# Patient Record
Sex: FEMALE | Race: WHITE | Employment: OTHER | URBAN - METROPOLITAN AREA
[De-identification: names, ages, dates, MRNs, and addresses within clinical notes are randomized per-mention and may not be internally consistent; named-entity substitution may affect disease eponyms.]

---

## 2017-01-19 ENCOUNTER — TELEPHONE (OUTPATIENT)
Dept: FAMILY MEDICINE CLINIC | Facility: CLINIC | Age: 77
End: 2017-01-19

## 2017-01-19 NOTE — TELEPHONE ENCOUNTER
S/W Dr. Pascual Llanos she know pt right away due to her canceling and no showing the last several appts I advised of below Dr. Chay Way stated she is worried about having her come here due to feeling forced or anxious her BP could go up or worse she stated that seei

## 2017-01-19 NOTE — TELEPHONE ENCOUNTER
RIYA (daughter-in-law) called pt is depressed, and not taking meds it will be hard for her to get her out of house if we call and talk to her about an appt.  Fish Nam works over at the hospital pt has made appts for last few appts and canceled or no showed sh

## 2017-01-23 RX ORDER — LOSARTAN POTASSIUM 100 MG/1
TABLET ORAL
Qty: 30 TABLET | Refills: 0 | Status: SHIPPED | OUTPATIENT
Start: 2017-01-23 | End: 2017-03-25

## 2017-02-01 ENCOUNTER — TELEPHONE (OUTPATIENT)
Dept: FAMILY MEDICINE CLINIC | Facility: CLINIC | Age: 77
End: 2017-02-01

## 2017-02-16 RX ORDER — METOPROLOL SUCCINATE 50 MG/1
TABLET, EXTENDED RELEASE ORAL
Qty: 30 TABLET | Refills: 0 | Status: SHIPPED | OUTPATIENT
Start: 2017-02-16 | End: 2017-03-19

## 2017-02-16 RX ORDER — AMLODIPINE BESYLATE 10 MG/1
TABLET ORAL
Qty: 30 TABLET | Refills: 0 | Status: SHIPPED | OUTPATIENT
Start: 2017-02-16 | End: 2017-03-18

## 2017-02-22 RX ORDER — ESCITALOPRAM OXALATE 10 MG/1
TABLET ORAL
Qty: 30 TABLET | Refills: 0 | Status: SHIPPED | OUTPATIENT
Start: 2017-02-22 | End: 2017-05-04

## 2017-03-01 ENCOUNTER — TELEPHONE (OUTPATIENT)
Dept: FAMILY MEDICINE CLINIC | Facility: CLINIC | Age: 77
End: 2017-03-01

## 2017-03-01 NOTE — TELEPHONE ENCOUNTER
LVM TO CALL AND RES MED ANNUAL VISIT FROM 3/22/17 TO BEGINNING OF MAY , PT HAD MED ANNUAL VISIT LAST YEAR IN April 29TH

## 2017-03-08 RX ORDER — ESCITALOPRAM OXALATE 10 MG/1
TABLET ORAL
Qty: 30 TABLET | Refills: 0 | Status: SHIPPED | OUTPATIENT
Start: 2017-03-08 | End: 2017-05-04

## 2017-03-20 RX ORDER — AMLODIPINE BESYLATE 10 MG/1
TABLET ORAL
Qty: 30 TABLET | Refills: 0 | Status: SHIPPED | OUTPATIENT
Start: 2017-03-20 | End: 2017-04-18

## 2017-03-20 RX ORDER — METOPROLOL SUCCINATE 50 MG/1
TABLET, EXTENDED RELEASE ORAL
Qty: 30 TABLET | Refills: 1 | Status: SHIPPED | OUTPATIENT
Start: 2017-03-20 | End: 2017-05-04

## 2017-03-27 RX ORDER — LOSARTAN POTASSIUM 100 MG/1
TABLET ORAL
Qty: 30 TABLET | Refills: 0 | Status: SHIPPED | OUTPATIENT
Start: 2017-03-27 | End: 2017-04-23

## 2017-04-19 RX ORDER — AMLODIPINE BESYLATE 10 MG/1
TABLET ORAL
Qty: 30 TABLET | Refills: 0 | Status: SHIPPED | OUTPATIENT
Start: 2017-04-19 | End: 2017-05-04

## 2017-04-24 RX ORDER — LOSARTAN POTASSIUM 100 MG/1
TABLET ORAL
Qty: 30 TABLET | Refills: 0 | Status: SHIPPED | OUTPATIENT
Start: 2017-04-24 | End: 2017-05-04

## 2017-04-28 RX ORDER — HYDROCHLOROTHIAZIDE 25 MG/1
TABLET ORAL
Qty: 30 TABLET | Refills: 0 | Status: SHIPPED | OUTPATIENT
Start: 2017-04-28 | End: 2017-05-04

## 2017-05-04 ENCOUNTER — OFFICE VISIT (OUTPATIENT)
Dept: FAMILY MEDICINE CLINIC | Facility: CLINIC | Age: 77
End: 2017-05-04

## 2017-05-04 VITALS
WEIGHT: 129 LBS | BODY MASS INDEX: 23.74 KG/M2 | RESPIRATION RATE: 16 BRPM | SYSTOLIC BLOOD PRESSURE: 116 MMHG | HEIGHT: 62 IN | TEMPERATURE: 97 F | HEART RATE: 86 BPM | DIASTOLIC BLOOD PRESSURE: 64 MMHG

## 2017-05-04 DIAGNOSIS — R60.0 BILATERAL EDEMA OF LOWER EXTREMITY: ICD-10-CM

## 2017-05-04 DIAGNOSIS — E78.2 HYPERLIPIDEMIA, MIXED: ICD-10-CM

## 2017-05-04 DIAGNOSIS — Z00.00 ENCOUNTER FOR ANNUAL HEALTH EXAMINATION: Primary | ICD-10-CM

## 2017-05-04 DIAGNOSIS — Z78.0 POSTMENOPAUSAL: ICD-10-CM

## 2017-05-04 DIAGNOSIS — I48.0 PAROXYSMAL A-FIB (HCC): ICD-10-CM

## 2017-05-04 DIAGNOSIS — I10 ESSENTIAL HYPERTENSION: ICD-10-CM

## 2017-05-04 DIAGNOSIS — R71.8 ELEVATED MCV: ICD-10-CM

## 2017-05-04 DIAGNOSIS — F32.A DEPRESSION, UNSPECIFIED DEPRESSION TYPE: ICD-10-CM

## 2017-05-04 DIAGNOSIS — F41.9 ANXIETY: ICD-10-CM

## 2017-05-04 DIAGNOSIS — F17.200 SMOKER: ICD-10-CM

## 2017-05-04 PROCEDURE — G0444 DEPRESSION SCREEN ANNUAL: HCPCS | Performed by: FAMILY MEDICINE

## 2017-05-04 PROCEDURE — G0439 PPPS, SUBSEQ VISIT: HCPCS | Performed by: FAMILY MEDICINE

## 2017-05-04 PROCEDURE — 99214 OFFICE O/P EST MOD 30 MIN: CPT | Performed by: FAMILY MEDICINE

## 2017-05-04 RX ORDER — METOPROLOL SUCCINATE 50 MG/1
TABLET, EXTENDED RELEASE ORAL
Qty: 30 TABLET | Refills: 5 | Status: SHIPPED | OUTPATIENT
Start: 2017-05-04 | End: 2018-03-02

## 2017-05-04 RX ORDER — ESCITALOPRAM OXALATE 10 MG/1
TABLET ORAL
Qty: 30 TABLET | Refills: 5 | Status: SHIPPED | OUTPATIENT
Start: 2017-05-04 | End: 2017-12-22

## 2017-05-04 RX ORDER — LOSARTAN POTASSIUM 100 MG/1
TABLET ORAL
Qty: 30 TABLET | Refills: 5 | Status: SHIPPED | OUTPATIENT
Start: 2017-05-04 | End: 2017-10-24

## 2017-05-04 RX ORDER — HYDROCHLOROTHIAZIDE 25 MG/1
25 TABLET ORAL
Qty: 30 TABLET | Refills: 5 | Status: SHIPPED | OUTPATIENT
Start: 2017-05-04 | End: 2017-11-26

## 2017-05-04 RX ORDER — AMLODIPINE BESYLATE 10 MG/1
TABLET ORAL
Qty: 30 TABLET | Refills: 5 | Status: SHIPPED | OUTPATIENT
Start: 2017-05-04 | End: 2018-03-22

## 2017-05-04 NOTE — PROGRESS NOTES
HPI:   Dulce Santana is a 78year old female who presents for a Medicare Subsequent Annual Wellness visit (Pt already had Initial Annual Wellness) also follow-up on hypertension hyperlipidemia edema legs depression/anxiety postmenopausal status, A.  Fi Labs:     Lab Results  Component Value Date   AST 22 06/09/2016   ALT 21 06/09/2016   CA 9.4 06/09/2016   ALB 4.4 06/09/2016   TSH 4.060 04/29/2016   CREATSERUM 1.45* 06/09/2016   GLU 90 06/09/2016        CBC  (most recent labs)     Lab Results  Component denies chest pain on exertion  GI: denies abdominal pain, denies heartburn  : denies dysuria, vaginal discharge or itching, no complaint of urinary incontinence   MUSCULOSKELETAL: denies back pain  NEURO: denies headaches  PSYCHE: denies depression or an Neurologic: Normal       SUGGESTED VACCINATIONS - Influenza, Pneumococcal, Zoster, Tetanus     Immunization History   Administered Date(s) Administered   • 6-35 MON FLUZONE QUAD PRESERVE FREE SINGLE DOSE (60630) FLU CLINIC 11/09/2015   • Pneumococcal (Pr information      [facility] on file in Epic:    Danica Button does not have a Power of  for [employer] on file in [facility]. Discussed with patient and provided information          PLAN:  Continue current meds.    Watch diet for fa : No    Memory Problems?: No      Fall/Risk Assessment     Do you have 3 or more medical conditions?: 1-Yes    Have you fallen in the last 12 months?: 1-Yes    Do you accidently lose urine?: 1-Yes (coughingm gives her a little dribble)    Do you have diffi Colonoscopy Screen every 10 years Colonoscopy,10 Years due on 09/20/1992 Update Health Maintenance if applicable    Flex Sigmoidoscopy Screen every 10 years No results found for this or any previous visit. No flowsheet data found.      Fecal Occult Bloo needed    Zoster  Not covered by Medicare Part B No vaccine history found This may be covered with your pharmacy  prescription benefits        [facility] Internal Lab or Procedure External Lab or Procedure   Annual Monitoring of Persistent

## 2017-05-04 NOTE — PATIENT INSTRUCTIONS
Continue current meds. Watch diet for fats and carbs. Stay active. Increase hydration. Call [phone] to schedule  bone density scan  - screening for osteoporosis. Do test from the stool for colon cancer screening. Do fasting blood work.   Camille Lucio results found for this or any previous visit.  Limited to patients who meet one of the following criteria:   • Men who are 73-68 years old and have smoked more than 100 cigarettes in their lifetime   • Anyone with a family history    Colorectal Cancer Scree this Mammogram regularly   Immunizations      Influenza  Covered Annually No orders found for this or any previous visit.  Please get every year    Pneumococcal 13 (Prevnar)  Covered Once after 65   Orders placed or performed in visit on 05/15/15  [facility] AT [facility] [place]

## 2017-08-01 ENCOUNTER — LAB ENCOUNTER (OUTPATIENT)
Dept: LAB | Age: 77
End: 2017-08-01
Attending: FAMILY MEDICINE
Payer: MEDICARE

## 2017-08-01 ENCOUNTER — PRIOR ORIGINAL RECORDS (OUTPATIENT)
Dept: OTHER | Age: 77
End: 2017-08-01

## 2017-08-01 ENCOUNTER — OFFICE VISIT (OUTPATIENT)
Dept: FAMILY MEDICINE CLINIC | Facility: CLINIC | Age: 77
End: 2017-08-01

## 2017-08-01 ENCOUNTER — HOSPITAL ENCOUNTER (OUTPATIENT)
Dept: GENERAL RADIOLOGY | Age: 77
Discharge: HOME | End: 2017-08-01
Attending: FAMILY MEDICINE
Payer: MEDICARE

## 2017-08-01 VITALS
SYSTOLIC BLOOD PRESSURE: 138 MMHG | WEIGHT: 131 LBS | OXYGEN SATURATION: 98 % | BODY MASS INDEX: 24.11 KG/M2 | DIASTOLIC BLOOD PRESSURE: 80 MMHG | RESPIRATION RATE: 18 BRPM | HEIGHT: 62 IN | HEART RATE: 52 BPM | TEMPERATURE: 98 F

## 2017-08-01 DIAGNOSIS — R53.83 FATIGUE, UNSPECIFIED TYPE: ICD-10-CM

## 2017-08-01 DIAGNOSIS — F41.9 ANXIETY: ICD-10-CM

## 2017-08-01 DIAGNOSIS — R71.8 ELEVATED MCV: ICD-10-CM

## 2017-08-01 DIAGNOSIS — J02.9 SORE THROAT: Primary | ICD-10-CM

## 2017-08-01 DIAGNOSIS — M54.2 NECK PAIN: ICD-10-CM

## 2017-08-01 DIAGNOSIS — R74.8 ELEVATED ALKALINE PHOSPHATASE LEVEL: Primary | ICD-10-CM

## 2017-08-01 DIAGNOSIS — I10 ESSENTIAL HYPERTENSION: ICD-10-CM

## 2017-08-01 DIAGNOSIS — R74.8 ELEVATED ALKALINE PHOSPHATASE LEVEL: ICD-10-CM

## 2017-08-01 DIAGNOSIS — J20.9 ACUTE BRONCHITIS, UNSPECIFIED: ICD-10-CM

## 2017-08-01 DIAGNOSIS — J18.9 PNEUMONIA, COMMUNITY ACQUIRED: Primary | ICD-10-CM

## 2017-08-01 DIAGNOSIS — E78.2 HYPERLIPIDEMIA, MIXED: ICD-10-CM

## 2017-08-01 DIAGNOSIS — F17.200 SMOKER: ICD-10-CM

## 2017-08-01 DIAGNOSIS — I10 ESSENTIAL HYPERTENSION WITH GOAL BLOOD PRESSURE LESS THAN 150/90: ICD-10-CM

## 2017-08-01 DIAGNOSIS — F17.200 TOBACCO USE DISORDER: ICD-10-CM

## 2017-08-01 LAB
ALBUMIN SERPL-MCNC: 3.8 G/DL (ref 3.5–4.8)
ALP LIVER SERPL-CCNC: 211 U/L (ref 55–142)
ALT SERPL-CCNC: 33 U/L (ref 14–54)
AST SERPL-CCNC: 34 U/L (ref 15–41)
BASOPHILS # BLD AUTO: 0.02 X10(3) UL (ref 0–0.1)
BASOPHILS NFR BLD AUTO: 0.3 %
BILIRUB SERPL-MCNC: 1 MG/DL (ref 0.1–2)
BUN BLD-MCNC: 15 MG/DL (ref 8–20)
CALCIUM BLD-MCNC: 9.1 MG/DL (ref 8.3–10.3)
CHLORIDE: 108 MMOL/L (ref 101–111)
CHOLEST SMN-MCNC: 201 MG/DL (ref ?–200)
CO2: 24 MMOL/L (ref 22–32)
CONTROL LINE PRESENT WITH A CLEAR BACKGROUND (YES/NO): YES YES/NO
CREAT BLD-MCNC: 1.11 MG/DL (ref 0.55–1.02)
EOSINOPHIL # BLD AUTO: 0.04 X10(3) UL (ref 0–0.3)
EOSINOPHIL NFR BLD AUTO: 0.7 %
ERYTHROCYTE [DISTWIDTH] IN BLOOD BY AUTOMATED COUNT: 15 % (ref 11.5–16)
FOLATE (FOLIC ACID), SERUM: 18.4 NG/ML (ref 8.7–24)
GLUCOSE BLD-MCNC: 100 MG/DL (ref 70–99)
HAV AB SERPL IA-ACNC: 416 PG/ML (ref 193–986)
HCT VFR BLD AUTO: 40.5 % (ref 34–50)
HDLC SERPL-MCNC: 42 MG/DL (ref 45–?)
HDLC SERPL: 4.79 {RATIO} (ref ?–4.44)
HGB BLD-MCNC: 12.6 G/DL (ref 12–16)
IMMATURE GRANULOCYTE COUNT: 0.03 X10(3) UL (ref 0–1)
IMMATURE GRANULOCYTE RATIO %: 0.5 %
LDLC SERPL CALC-MCNC: 110 MG/DL (ref ?–130)
LDLC SERPL-MCNC: 49 MG/DL (ref 5–40)
LYMPHOCYTES # BLD AUTO: 1.17 X10(3) UL (ref 0.9–4)
LYMPHOCYTES NFR BLD AUTO: 19.4 %
M PROTEIN MFR SERPL ELPH: 7.4 G/DL (ref 6.1–8.3)
MCH RBC QN AUTO: 33.8 PG (ref 27–33.2)
MCHC RBC AUTO-ENTMCNC: 31.1 G/DL (ref 31–37)
MCV RBC AUTO: 108.6 FL (ref 81–100)
MONOCYTES # BLD AUTO: 0.34 X10(3) UL (ref 0.1–0.6)
MONOCYTES NFR BLD AUTO: 5.6 %
MULTISTIX LOT#: ABNORMAL NUMERIC
NEUTROPHIL ABS PRELIM: 4.43 X10 (3) UL (ref 1.3–6.7)
NEUTROPHILS # BLD AUTO: 4.43 X10(3) UL (ref 1.3–6.7)
NEUTROPHILS NFR BLD AUTO: 73.5 %
NONHDLC SERPL-MCNC: 159 MG/DL (ref ?–130)
PH, URINE: 5.5 (ref 4.5–8)
PLATELET # BLD AUTO: 106 10(3)UL (ref 150–450)
POTASSIUM SERPL-SCNC: 4 MMOL/L (ref 3.6–5.1)
RBC # BLD AUTO: 3.73 X10(6)UL (ref 3.8–5.1)
RED CELL DISTRIBUTION WIDTH-SD: 60.6 FL (ref 35.1–46.3)
SODIUM SERPL-SCNC: 140 MMOL/L (ref 136–144)
SPECIFIC GRAVITY: 1 (ref 1–1.03)
TRIGLYCERIDES: 243 MG/DL (ref ?–150)
TSI SER-ACNC: 4.03 MIU/ML (ref 0.35–5.5)
URINE-COLOR: YELLOW
UROBILINOGEN,SEMI-QN: 0.2 MG/DL (ref 0–1.9)
WBC # BLD AUTO: 6 X10(3) UL (ref 4–13)

## 2017-08-01 PROCEDURE — 87880 STREP A ASSAY W/OPTIC: CPT | Performed by: FAMILY MEDICINE

## 2017-08-01 PROCEDURE — 85025 COMPLETE CBC W/AUTO DIFF WBC: CPT

## 2017-08-01 PROCEDURE — 36415 COLL VENOUS BLD VENIPUNCTURE: CPT

## 2017-08-01 PROCEDURE — 80053 COMPREHEN METABOLIC PANEL: CPT

## 2017-08-01 PROCEDURE — 82607 VITAMIN B-12: CPT

## 2017-08-01 PROCEDURE — 87081 CULTURE SCREEN ONLY: CPT | Performed by: FAMILY MEDICINE

## 2017-08-01 PROCEDURE — 84075 ASSAY ALKALINE PHOSPHATASE: CPT

## 2017-08-01 PROCEDURE — 84080 ASSAY ALKALINE PHOSPHATASES: CPT

## 2017-08-01 PROCEDURE — 80061 LIPID PANEL: CPT

## 2017-08-01 PROCEDURE — 84443 ASSAY THYROID STIM HORMONE: CPT

## 2017-08-01 PROCEDURE — 82746 ASSAY OF FOLIC ACID SERUM: CPT

## 2017-08-01 PROCEDURE — 71020 XR CHEST PA + LAT CHEST (CPT=71020): CPT | Performed by: FAMILY MEDICINE

## 2017-08-01 PROCEDURE — 99214 OFFICE O/P EST MOD 30 MIN: CPT | Performed by: FAMILY MEDICINE

## 2017-08-01 PROCEDURE — 99406 BEHAV CHNG SMOKING 3-10 MIN: CPT | Performed by: FAMILY MEDICINE

## 2017-08-01 PROCEDURE — 81003 URINALYSIS AUTO W/O SCOPE: CPT | Performed by: FAMILY MEDICINE

## 2017-08-01 RX ORDER — ACETAMINOPHEN 325 MG/1
325 TABLET ORAL AS NEEDED
COMMUNITY
End: 2018-05-10 | Stop reason: ALTCHOICE

## 2017-08-01 RX ORDER — AZITHROMYCIN 250 MG/1
TABLET, FILM COATED ORAL
Qty: 6 TABLET | Refills: 0 | Status: SHIPPED | OUTPATIENT
Start: 2017-08-01 | End: 2017-08-02 | Stop reason: ALTCHOICE

## 2017-08-01 RX ORDER — AZITHROMYCIN 250 MG/1
TABLET, FILM COATED ORAL
Qty: 6 TABLET | Refills: 0 | Status: SHIPPED | OUTPATIENT
Start: 2017-08-01 | End: 2017-08-01

## 2017-08-01 NOTE — PROGRESS NOTES
[facility] Family Medicine Office Note  Chief Complaint:   Patient presents with:  Neck Pain: x 1 day      HPI:   This is a 78year old female coming in for neck pain since yesterday morning. Pain is about 5/10. Took tylenol with some relief.  No GALLBLADDER  Social History:  Smoking status: Current Every Day Smoker                                                   Packs/day: 0.50      Years: 50.00     Smokeless tobacco: Never Used                      Alcohol use:  No              Family History: discomfort, or palpitations, + edema, denies dyspnea on exertion or at rest.  RESPIRATORY:  Denies shortness of breath, wheezing, cough or sputum. GASTROINTESTINAL:  Denies abdominal pain, nausea, vomiting, constipation, diarrhea, or blood in stool.   MUSC tenderness, no spasm. EXTREMITIES:  Trace edema, legs, no cyanosis, no clubbing, FROM, 2+ dorsalis pedis pulses bilaterally, uses cane. NEURO:  No deficit, normal gait, strength and tone, sensory intact. ASSESSMENT AND PLAN:   1.  Sore throat  Rapid st

## 2017-08-01 NOTE — PATIENT INSTRUCTIONS
Managing Fatigue     Family members can help with meals and chores around the house. Fatigue is common. It can be caused by worry, lack of sleep, or poor appetite. Fatigue can also be a sign of anemia, a shortage of red blood cells.  You might need medi · Extreme tiredness that is not helped by sleep   Date Last Reviewed: 1/3/2016  © 0093-6981 The [address] [place] [address], [facility] [place]. All rights reserved.  This information is not intended as a substitute for professional medical · Stop smoking or reduce contact with secondhand smoke. Smoke irritates the tender throat lining. · Limit contact with pets and with allergy-causing substances, such as pollen and mold.   · When you’re around someone with a sore throat or cold, wash your h Dead cells and fluid build up in the lymph nodes as they help fight infection or disease. This causes them to swell in size. Enlarged lymph nodes are often near the source of infection. This can help to find the cause of an infection.  For example, swollen · Lymph node biopsy. If lymph nodes are swollen for 3 to 4 weeks, they may be checked with a biopsy. Small samples of lymph node tissue are taken and checked in a lab for signs of cancer.  You may be referred to a specialist in blood disorders and cancer (h

## 2017-08-02 RX ORDER — WARFARIN SODIUM 5 MG/1
TABLET ORAL
Refills: 0 | COMMUNITY
Start: 2017-08-02 | End: 2017-09-15

## 2017-08-03 LAB
ALK-PHOSPHATASE BONE CALC: 36 U/L
ALK-PHOSPHATASE LIVER CALC: 156 U/L
ALK-PHOSPHATASE OTHER CALC: 0 U/L
ALKALINE PHOSPHATASE: 192 U/L

## 2017-08-10 ENCOUNTER — OFFICE VISIT (OUTPATIENT)
Dept: FAMILY MEDICINE CLINIC | Facility: CLINIC | Age: 77
End: 2017-08-10

## 2017-08-10 ENCOUNTER — HOSPITAL ENCOUNTER (OUTPATIENT)
Dept: GENERAL RADIOLOGY | Age: 77
Discharge: HOME | End: 2017-08-10
Attending: FAMILY MEDICINE
Payer: MEDICARE

## 2017-08-10 VITALS
HEART RATE: 62 BPM | HEIGHT: 62 IN | RESPIRATION RATE: 16 BRPM | TEMPERATURE: 97 F | BODY MASS INDEX: 23 KG/M2 | DIASTOLIC BLOOD PRESSURE: 60 MMHG | WEIGHT: 125 LBS | SYSTOLIC BLOOD PRESSURE: 122 MMHG

## 2017-08-10 DIAGNOSIS — R74.8 ELEVATED ALKALINE PHOSPHATASE LEVEL: ICD-10-CM

## 2017-08-10 DIAGNOSIS — D69.6 THROMBOCYTOPENIA (HCC): ICD-10-CM

## 2017-08-10 DIAGNOSIS — R53.83 FATIGUE, UNSPECIFIED TYPE: ICD-10-CM

## 2017-08-10 DIAGNOSIS — I10 ESSENTIAL HYPERTENSION: ICD-10-CM

## 2017-08-10 DIAGNOSIS — F17.200 SMOKER: ICD-10-CM

## 2017-08-10 DIAGNOSIS — J18.9 PNEUMONIA OF RIGHT LOWER LOBE DUE TO INFECTIOUS ORGANISM: Primary | ICD-10-CM

## 2017-08-10 DIAGNOSIS — J18.9 PNEUMONIA OF RIGHT LOWER LOBE DUE TO INFECTIOUS ORGANISM: ICD-10-CM

## 2017-08-10 PROCEDURE — 99214 OFFICE O/P EST MOD 30 MIN: CPT | Performed by: FAMILY MEDICINE

## 2017-08-10 PROCEDURE — 71020 XR CHEST PA + LAT CHEST (CPT=71020): CPT | Performed by: FAMILY MEDICINE

## 2017-08-10 NOTE — PATIENT INSTRUCTIONS
Do chest x-ray today. Do blood work and ultrasound of the abdomen next week. Call [phone] to schedule  ultrasound of the abdomen.

## 2017-08-10 NOTE — PROGRESS NOTES
Curry Carlisle is a 78year old female. cc pneumonia, elevated alkaline phosphatase, hypertension, fatigue, smoker. HPI:   Patient is coming for follow-up visit from last week. She came to the office complaining of having tiredness and fatigue.   She was Oral Tab Take 1 tablet (25 mg total) by mouth once daily. Disp: 30 tablet Rfl: 5   Warfarin Sodium 2.5 MG Oral Tab Take 2.5 mg by mouth nightly.  Disp:  Rfl:       Past Medical History:   Diagnosis Date   • Anxiety    • Anxiety state, unspecified    • Atria Phosphatase 211 (H) 55 - 142 U/L   AST 34 15 - 41 U/L   Alt 33 14 - 54 U/L   Bilirubin, Total 1.0 0.1 - 2.0 mg/dL   Total Protein 7.4 6.1 - 8.3 g/dL   Albumin 3.8 3.5 - 4.8 g/dL   Sodium 140 136 - 144 mmol/L   Potassium 4.0 3.6 - 5.1 mmol/L   Chloride 108 alkaline phosphatase level  Thrombocytopenia (hcc)  Pneumonia of right lower lobe due to infectious organism (hcc)  (primary encounter diagnosis)  Fatigue, unspecified type  Smoker  Essential hypertension      Orders Placed This Encounter      COMP Teddy Mack

## 2017-08-17 ENCOUNTER — HOSPITAL ENCOUNTER (OUTPATIENT)
Dept: ULTRASOUND IMAGING | Facility: HOSPITAL | Age: 77
Discharge: HOME | End: 2017-08-17
Attending: FAMILY MEDICINE
Payer: MEDICARE

## 2017-08-17 ENCOUNTER — PRIOR ORIGINAL RECORDS (OUTPATIENT)
Dept: OTHER | Age: 77
End: 2017-08-17

## 2017-08-17 ENCOUNTER — LAB ENCOUNTER (OUTPATIENT)
Dept: LAB | Facility: HOSPITAL | Age: 77
End: 2017-08-17
Attending: FAMILY MEDICINE
Payer: MEDICARE

## 2017-08-17 DIAGNOSIS — R74.8 ELEVATED ALKALINE PHOSPHATASE LEVEL: ICD-10-CM

## 2017-08-17 DIAGNOSIS — D69.6 THROMBOCYTOPENIA (HCC): ICD-10-CM

## 2017-08-17 LAB
ALBUMIN SERPL-MCNC: 4 G/DL (ref 3.5–4.8)
ALP LIVER SERPL-CCNC: 158 U/L (ref 55–142)
ALT SERPL-CCNC: 19 U/L (ref 14–54)
AST SERPL-CCNC: 24 U/L (ref 15–41)
BASOPHILS # BLD AUTO: 0.05 X10(3) UL (ref 0–0.1)
BASOPHILS NFR BLD AUTO: 1.1 %
BILIRUB SERPL-MCNC: 0.7 MG/DL (ref 0.1–2)
BUN BLD-MCNC: 20 MG/DL (ref 8–20)
CALCIUM BLD-MCNC: 9.7 MG/DL (ref 8.3–10.3)
CHLORIDE: 103 MMOL/L (ref 101–111)
CO2: 28 MMOL/L (ref 22–32)
CREAT BLD-MCNC: 1.17 MG/DL (ref 0.55–1.02)
EOSINOPHIL # BLD AUTO: 0.11 X10(3) UL (ref 0–0.3)
EOSINOPHIL NFR BLD AUTO: 2.4 %
ERYTHROCYTE [DISTWIDTH] IN BLOOD BY AUTOMATED COUNT: 14.6 % (ref 11.5–16)
GLUCOSE BLD-MCNC: 96 MG/DL (ref 70–99)
HAV IGM SER QL: NONREACTIVE
HBV CORE IGM SER QL: NONREACTIVE
HBV SURFACE AG SERPL QL IA: NONREACTIVE
HCT VFR BLD AUTO: 41.3 % (ref 34–50)
HEPATITIS C VIRUS AB INTERPRETATION: NONREACTIVE
HGB BLD-MCNC: 13.1 G/DL (ref 12–16)
IMMATURE GRANULOCYTE COUNT: 0.01 X10(3) UL (ref 0–1)
IMMATURE GRANULOCYTE RATIO %: 0.2 %
LYMPHOCYTES # BLD AUTO: 1.81 X10(3) UL (ref 0.9–4)
LYMPHOCYTES NFR BLD AUTO: 39.3 %
M PROTEIN MFR SERPL ELPH: 7.6 G/DL (ref 6.1–8.3)
MCH RBC QN AUTO: 34.1 PG (ref 27–33.2)
MCHC RBC AUTO-ENTMCNC: 31.7 G/DL (ref 31–37)
MCV RBC AUTO: 107.6 FL (ref 81–100)
MONOCYTES # BLD AUTO: 0.35 X10(3) UL (ref 0.1–0.6)
MONOCYTES NFR BLD AUTO: 7.6 %
NEUTROPHIL ABS PRELIM: 2.27 X10 (3) UL (ref 1.3–6.7)
NEUTROPHILS # BLD AUTO: 2.27 X10(3) UL (ref 1.3–6.7)
NEUTROPHILS NFR BLD AUTO: 49.4 %
PLATELET # BLD AUTO: 134 10(3)UL (ref 150–450)
POTASSIUM SERPL-SCNC: 3.9 MMOL/L (ref 3.6–5.1)
RBC # BLD AUTO: 3.84 X10(6)UL (ref 3.8–5.1)
RED CELL DISTRIBUTION WIDTH-SD: 58.4 FL (ref 35.1–46.3)
SODIUM SERPL-SCNC: 138 MMOL/L (ref 136–144)
WBC # BLD AUTO: 4.6 X10(3) UL (ref 4–13)

## 2017-08-17 PROCEDURE — 85025 COMPLETE CBC W/AUTO DIFF WBC: CPT

## 2017-08-17 PROCEDURE — 80074 ACUTE HEPATITIS PANEL: CPT

## 2017-08-17 PROCEDURE — 76700 US EXAM ABDOM COMPLETE: CPT | Performed by: FAMILY MEDICINE

## 2017-08-17 PROCEDURE — 80053 COMPREHEN METABOLIC PANEL: CPT

## 2017-08-17 PROCEDURE — 36415 COLL VENOUS BLD VENIPUNCTURE: CPT

## 2017-08-18 ENCOUNTER — TELEPHONE (OUTPATIENT)
Dept: FAMILY MEDICINE CLINIC | Facility: CLINIC | Age: 77
End: 2017-08-18

## 2017-08-18 DIAGNOSIS — R74.8 ELEVATED ALKALINE PHOSPHATASE LEVEL: ICD-10-CM

## 2017-08-18 DIAGNOSIS — D69.6 THROMBOCYTOPENIA (HCC): ICD-10-CM

## 2017-08-18 DIAGNOSIS — E78.5 HYPERLIPIDEMIA, UNSPECIFIED HYPERLIPIDEMIA TYPE: Primary | ICD-10-CM

## 2017-09-15 RX ORDER — WARFARIN SODIUM 5 MG/1
TABLET ORAL
Qty: 50 TABLET | Refills: 2 | Status: SHIPPED | OUTPATIENT
Start: 2017-09-15

## 2017-10-24 DIAGNOSIS — I10 ESSENTIAL HYPERTENSION: ICD-10-CM

## 2017-10-26 RX ORDER — LOSARTAN POTASSIUM 100 MG/1
TABLET ORAL
Qty: 30 TABLET | Refills: 1 | Status: SHIPPED | OUTPATIENT
Start: 2017-10-26 | End: 2018-03-08

## 2017-11-04 DIAGNOSIS — F32.A DEPRESSION, UNSPECIFIED DEPRESSION TYPE: ICD-10-CM

## 2017-11-04 DIAGNOSIS — F41.9 ANXIETY: ICD-10-CM

## 2017-11-22 RX ORDER — ESCITALOPRAM OXALATE 10 MG/1
TABLET ORAL
Qty: 30 TABLET | Refills: 5 | OUTPATIENT
Start: 2017-11-22

## 2017-11-22 NOTE — TELEPHONE ENCOUNTER
Discussed with dr Nuno Ferrera refuse escitalopram refill with note that pt needs to call ofc for appt.

## 2017-11-26 DIAGNOSIS — I10 ESSENTIAL HYPERTENSION: ICD-10-CM

## 2017-11-27 RX ORDER — HYDROCHLOROTHIAZIDE 25 MG/1
25 TABLET ORAL
Qty: 30 TABLET | Refills: 0 | Status: SHIPPED | OUTPATIENT
Start: 2017-11-27 | End: 2018-03-22

## 2017-12-18 RX ORDER — AMLODIPINE BESYLATE 10 MG/1
TABLET ORAL
Qty: 30 TABLET | Refills: 0 | Status: SHIPPED | OUTPATIENT
Start: 2017-12-18 | End: 2018-03-22

## 2017-12-22 DIAGNOSIS — F32.A DEPRESSION, UNSPECIFIED DEPRESSION TYPE: ICD-10-CM

## 2017-12-22 DIAGNOSIS — F41.9 ANXIETY: ICD-10-CM

## 2017-12-22 RX ORDER — ESCITALOPRAM OXALATE 10 MG/1
TABLET ORAL
Qty: 30 TABLET | Refills: 0 | Status: SHIPPED | OUTPATIENT
Start: 2017-12-22 | End: 2018-01-23

## 2017-12-22 RX ORDER — WARFARIN SODIUM 5 MG/1
TABLET ORAL
Qty: 50 TABLET | Refills: 2 | OUTPATIENT
Start: 2017-12-22

## 2017-12-22 NOTE — TELEPHONE ENCOUNTER
ESCITALOPRAM 10 MG TABLET    Not a per protocol medication. Rx is pending for your approval.    Please sign,    Thank you    She does need an appt. A message was left for her to schedule.

## 2017-12-22 NOTE — TELEPHONE ENCOUNTER
WARFARIN SODIUM 5 MG TABLET    Not a per protocol medication. Rx is pending for your approval.    Please sign,    Thank you    Her last OV was on 05/04/17 for her Medicare Wellness.

## 2018-01-23 DIAGNOSIS — F32.A DEPRESSION, UNSPECIFIED DEPRESSION TYPE: ICD-10-CM

## 2018-01-23 DIAGNOSIS — F41.9 ANXIETY: ICD-10-CM

## 2018-01-24 NOTE — TELEPHONE ENCOUNTER
ESCITALOPRAM 10 MG TABLET    Not a per protocol medication. The patient is due for a her medication follow up. Her last OV was on 05/04/2017 for her Medicare Wellness. Please ask her to schedule her med check for further refills.      Thank you

## 2018-01-31 DIAGNOSIS — F32.A DEPRESSION, UNSPECIFIED DEPRESSION TYPE: ICD-10-CM

## 2018-01-31 DIAGNOSIS — F41.9 ANXIETY: ICD-10-CM

## 2018-01-31 RX ORDER — ESCITALOPRAM OXALATE 10 MG/1
TABLET ORAL
Qty: 30 TABLET | Refills: 0 | Status: SHIPPED | OUTPATIENT
Start: 2018-01-31 | End: 2018-03-08

## 2018-01-31 NOTE — TELEPHONE ENCOUNTER
ESCITALOPRAM 10 MG TABLET    The patient has an appt scheduled for 76*49*32.     Rx is pending for your approval.    Please print & sign, thank you

## 2018-02-01 RX ORDER — ESCITALOPRAM OXALATE 10 MG/1
TABLET ORAL
Qty: 30 TABLET | Refills: 0 | OUTPATIENT
Start: 2018-02-01

## 2018-02-07 ENCOUNTER — PRIOR ORIGINAL RECORDS (OUTPATIENT)
Dept: OTHER | Age: 78
End: 2018-02-07

## 2018-02-11 DIAGNOSIS — I10 ESSENTIAL HYPERTENSION: ICD-10-CM

## 2018-02-14 RX ORDER — LOSARTAN POTASSIUM 100 MG/1
TABLET ORAL
Qty: 30 TABLET | Refills: 1 | OUTPATIENT
Start: 2018-02-14

## 2018-03-02 DIAGNOSIS — I10 ESSENTIAL HYPERTENSION: ICD-10-CM

## 2018-03-02 DIAGNOSIS — I48.0 PAROXYSMAL A-FIB (HCC): ICD-10-CM

## 2018-03-05 RX ORDER — METOPROLOL SUCCINATE 50 MG/1
TABLET, EXTENDED RELEASE ORAL
Qty: 30 TABLET | Refills: 0 | Status: SHIPPED | OUTPATIENT
Start: 2018-03-05 | End: 2018-03-22

## 2018-03-08 DIAGNOSIS — F41.9 ANXIETY: ICD-10-CM

## 2018-03-08 DIAGNOSIS — I10 ESSENTIAL HYPERTENSION: ICD-10-CM

## 2018-03-08 DIAGNOSIS — F32.A DEPRESSION, UNSPECIFIED DEPRESSION TYPE: ICD-10-CM

## 2018-03-09 RX ORDER — ESCITALOPRAM OXALATE 10 MG/1
TABLET ORAL
Qty: 30 TABLET | Refills: 0 | Status: SHIPPED | OUTPATIENT
Start: 2018-03-09 | End: 2018-03-22

## 2018-03-09 RX ORDER — LOSARTAN POTASSIUM 100 MG/1
TABLET ORAL
Qty: 30 TABLET | Refills: 1 | Status: SHIPPED | OUTPATIENT
Start: 2018-03-09 | End: 2018-03-22

## 2018-03-09 NOTE — TELEPHONE ENCOUNTER
ESCITALOPRAM 10 MG TABLET    Not a per protocol medication.     Rx is pending for your approval.    Please sign,    Thank you    The patient has an appt on 03/22/2018

## 2018-03-22 ENCOUNTER — OFFICE VISIT (OUTPATIENT)
Dept: FAMILY MEDICINE CLINIC | Facility: CLINIC | Age: 78
End: 2018-03-22

## 2018-03-22 VITALS
TEMPERATURE: 97 F | HEART RATE: 78 BPM | WEIGHT: 130 LBS | DIASTOLIC BLOOD PRESSURE: 60 MMHG | RESPIRATION RATE: 16 BRPM | SYSTOLIC BLOOD PRESSURE: 110 MMHG | BODY MASS INDEX: 24 KG/M2

## 2018-03-22 DIAGNOSIS — I10 ESSENTIAL HYPERTENSION: Primary | ICD-10-CM

## 2018-03-22 DIAGNOSIS — E78.2 HYPERLIPIDEMIA, MIXED: ICD-10-CM

## 2018-03-22 DIAGNOSIS — I48.0 PAROXYSMAL A-FIB (HCC): ICD-10-CM

## 2018-03-22 DIAGNOSIS — F17.200 SMOKER: ICD-10-CM

## 2018-03-22 DIAGNOSIS — F32.A DEPRESSION, UNSPECIFIED DEPRESSION TYPE: ICD-10-CM

## 2018-03-22 DIAGNOSIS — F41.9 ANXIETY: ICD-10-CM

## 2018-03-22 PROCEDURE — 99214 OFFICE O/P EST MOD 30 MIN: CPT | Performed by: FAMILY MEDICINE

## 2018-03-22 RX ORDER — ESCITALOPRAM OXALATE 10 MG/1
TABLET ORAL
Qty: 90 TABLET | Refills: 0 | Status: SHIPPED | OUTPATIENT
Start: 2018-03-22 | End: 2018-07-01

## 2018-03-22 RX ORDER — LOSARTAN POTASSIUM 100 MG/1
TABLET ORAL
Qty: 90 TABLET | Refills: 0 | Status: SHIPPED | OUTPATIENT
Start: 2018-03-22 | End: 2018-07-27

## 2018-03-22 RX ORDER — HYDROCHLOROTHIAZIDE 25 MG/1
25 TABLET ORAL
Qty: 90 TABLET | Refills: 0 | Status: SHIPPED | OUTPATIENT
Start: 2018-03-22 | End: 2018-06-21

## 2018-03-22 RX ORDER — METOPROLOL SUCCINATE 50 MG/1
TABLET, EXTENDED RELEASE ORAL
Qty: 90 TABLET | Refills: 0 | Status: SHIPPED | OUTPATIENT
Start: 2018-03-22 | End: 2018-07-01

## 2018-03-22 RX ORDER — AMLODIPINE BESYLATE 10 MG/1
TABLET ORAL
Qty: 90 TABLET | Refills: 0 | Status: SHIPPED | OUTPATIENT
Start: 2018-03-22 | End: 2018-06-21

## 2018-03-22 NOTE — PROGRESS NOTES
Gricelda Lewis is a 78year old female. cc hypertension, hyperlipidemia, A. fib, anxiety/depression,  HPI:   HTN - doing well with medications. No side effects. BP is controlled at home. Takes meds regularly. Needs refills.  No chest pain, No SOB, no palpi Alcohol use:  No                 REVIEW OF SYSTEMS:   GENERAL HEALTH: feels well otherwise  SKIN: denies any unusual skin lesions or rashes  HEENT no congestion no runny nose no sore throat  Neck no neck pain  RESPIRATORY: denies shortness of breath wi for fats and carbs. Stay active. Do fasting blood work. Schedule Medicare wellness visit around [facility]. .   Recommended smoking cessation. Imaging & Consults:  None    The patient indicates understanding of these issues and agrees to the plan.

## 2018-03-22 NOTE — PATIENT INSTRUCTIONS
Continue current meds. Watch diet for fats and carbs. Stay active. Do fasting blood work. Schedule Medicare wellness visit around [facility]. Oneal Claude

## 2018-05-10 ENCOUNTER — OFFICE VISIT (OUTPATIENT)
Dept: FAMILY MEDICINE CLINIC | Facility: CLINIC | Age: 78
End: 2018-05-10

## 2018-05-10 VITALS
SYSTOLIC BLOOD PRESSURE: 110 MMHG | WEIGHT: 127.75 LBS | RESPIRATION RATE: 16 BRPM | TEMPERATURE: 98 F | HEART RATE: 68 BPM | DIASTOLIC BLOOD PRESSURE: 66 MMHG | HEIGHT: 62 IN | BODY MASS INDEX: 23.51 KG/M2

## 2018-05-10 DIAGNOSIS — R71.8 ELEVATED MCV: ICD-10-CM

## 2018-05-10 DIAGNOSIS — Z12.11 SCREEN FOR COLON CANCER: ICD-10-CM

## 2018-05-10 DIAGNOSIS — F41.9 ANXIETY: ICD-10-CM

## 2018-05-10 DIAGNOSIS — F17.200 SMOKER: ICD-10-CM

## 2018-05-10 DIAGNOSIS — I10 ESSENTIAL HYPERTENSION: ICD-10-CM

## 2018-05-10 DIAGNOSIS — Z00.00 MEDICARE ANNUAL WELLNESS VISIT, SUBSEQUENT: Primary | ICD-10-CM

## 2018-05-10 DIAGNOSIS — Z78.0 POSTMENOPAUSAL: ICD-10-CM

## 2018-05-10 DIAGNOSIS — I48.0 PAROXYSMAL A-FIB (HCC): ICD-10-CM

## 2018-05-10 DIAGNOSIS — E78.2 HYPERLIPIDEMIA, MIXED: ICD-10-CM

## 2018-05-10 DIAGNOSIS — F32.A DEPRESSION, UNSPECIFIED DEPRESSION TYPE: ICD-10-CM

## 2018-05-10 PROCEDURE — G0444 DEPRESSION SCREEN ANNUAL: HCPCS | Performed by: FAMILY MEDICINE

## 2018-05-10 PROCEDURE — 99214 OFFICE O/P EST MOD 30 MIN: CPT | Performed by: FAMILY MEDICINE

## 2018-05-10 PROCEDURE — G0439 PPPS, SUBSEQ VISIT: HCPCS | Performed by: FAMILY MEDICINE

## 2018-05-10 PROCEDURE — 90732 PPSV23 VACC 2 YRS+ SUBQ/IM: CPT | Performed by: FAMILY MEDICINE

## 2018-05-10 PROCEDURE — G0009 ADMIN PNEUMOCOCCAL VACCINE: HCPCS | Performed by: FAMILY MEDICINE

## 2018-05-10 NOTE — PATIENT INSTRUCTIONS
Call [phone] to schedule Dexa scan,  Healthy diet. Stay active. Do test for occult blood from the stool. Schedule next visit October 2018.     Car Maciel's SCREENING SCHEDULE   Tests on this list are recommended by your physician but may not b their lifetime   • Anyone with a family history    Colorectal Cancer Screening  Covered up to Age 76     Colonoscopy Screen   Covered every 10 years- more often if abnormal Colonoscopy,10 Years due on 09/20/1992 Update Health Maintenance if applicable    F Pneumococcal 13 (Prevnar)  Covered Once after 65   Orders placed or performed in visit on 05/15/15  -PNEUMOCOCCAL VACC, 13 TADEO IM    Please get once after your 65th birthday    Pneumococcal 23 (Pneumovax)  Covered Once after 65   Orders placed or performed

## 2018-05-10 NOTE — PROGRESS NOTES
HPI:   Dawson Torrez is a 78year old female who presents for a Medicare Subsequent Annual Wellness visit (Pt already had Initial Annual Wellness) also follow-up on hypertension hyperlipidemia edema legs depression/anxiety postmenopausal status, A.  Fi Labs:     Lab Results  Component Value Date   CHOLEST 201 (H) 08/01/2017   HDL 42 (L) 08/01/2017    08/01/2017   TRIG 243 (H) 08/01/2017          Last Chemistry Labs:     Lab Results  Component Value Date   AST 24 08/17/2017   ALT 19 08/17/2017   CA not drink alcohol or use drugs.      REVIEW OF SYSTEMS:   GENERAL: feels well otherwise  SKIN: denies any unusual skin lesions  EYES: denies blurred vision or double vision  HEENT: denies nasal congestion, sinus pain or ST  LUNGS: denies shortness of breath murmur,    Abdomen:   Soft, non-tender, bowel sounds active all four quadrants,  no masses, no organomegaly   Pelvic: Deferred  Breast exam -deferred by patient.      Extremities: Extremities normal, atraumatic, no cyanosis  trace edema bilateral lower extr does not currently take aspirin. Patient is already taking aspirin      Diet assessment: good     Advanced Directive:  Living Will on file in [address]? Santiago Coon does not have a Living Will on file in [facility]  Discussed with patient and provided informati (patient has decreased hearing )     Functional Status     Hearing Problems?: Yes (patient has decreased hearing )    Vision Problems? : Yes    Difficulty walking?: Yes    Difficulty dressing or bathing?: Yes    Problems with daily activities? : Yes    Mem Annually LDL Cholesterol (mg/dL)   Date Value   08/01/2017 110        EKG - w/ Initial Preventative Physical Exam only, or if medically necessary Electrocardiogram date10/15/2014       Colorectal Cancer Screening      Colonoscopy Screen every 10 years [place] injectable drug abusers     Tetanus Toxoid  Only covered with a cut with metal- TD and TDaP Not covered by Medicare Part B No vaccine history found This may be covered with your prescription benefits, but Medicare does not cover unless Medically needed

## 2018-06-21 DIAGNOSIS — I10 ESSENTIAL HYPERTENSION: ICD-10-CM

## 2018-06-21 RX ORDER — AMLODIPINE BESYLATE 10 MG/1
TABLET ORAL
Qty: 90 TABLET | Refills: 0 | Status: SHIPPED | OUTPATIENT
Start: 2018-06-21 | End: 2018-09-16

## 2018-06-21 RX ORDER — HYDROCHLOROTHIAZIDE 25 MG/1
25 TABLET ORAL
Qty: 90 TABLET | Refills: 0 | Status: SHIPPED | OUTPATIENT
Start: 2018-06-21 | End: 2018-09-16

## 2018-07-01 DIAGNOSIS — F32.A DEPRESSION, UNSPECIFIED DEPRESSION TYPE: ICD-10-CM

## 2018-07-01 DIAGNOSIS — I10 ESSENTIAL HYPERTENSION: ICD-10-CM

## 2018-07-01 DIAGNOSIS — F41.9 ANXIETY: ICD-10-CM

## 2018-07-01 DIAGNOSIS — I48.0 PAROXYSMAL A-FIB (HCC): ICD-10-CM

## 2018-07-02 RX ORDER — ESCITALOPRAM OXALATE 10 MG/1
TABLET ORAL
Qty: 90 TABLET | Refills: 0 | Status: SHIPPED | OUTPATIENT
Start: 2018-07-02 | End: 2018-09-27

## 2018-07-02 RX ORDER — METOPROLOL SUCCINATE 50 MG/1
TABLET, EXTENDED RELEASE ORAL
Qty: 90 TABLET | Refills: 0 | Status: SHIPPED | OUTPATIENT
Start: 2018-07-02 | End: 2018-09-27

## 2018-07-02 NOTE — TELEPHONE ENCOUNTER
ESCITALOPRAM 10 MG TABLET    Not a per protocol medication.     Rx is pending for your approval.    Please sign,    Thank you

## 2018-07-09 ENCOUNTER — PRIOR ORIGINAL RECORDS (OUTPATIENT)
Dept: OTHER | Age: 78
End: 2018-07-09

## 2018-07-09 ENCOUNTER — MYAURORA ACCOUNT LINK (OUTPATIENT)
Dept: OTHER | Age: 78
End: 2018-07-09

## 2018-07-25 LAB
ALBUMIN: 4 G/DL
ALKALINE PHOSPHATATE(ALK PHOS): 158 IU/L
BILIRUBIN TOTAL: 0.7 MG/DL
BUN: 20 MG/DL
CALCIUM: 9.7 MG/DL
CHLORIDE: 103 MEQ/L
CHOLESTEROL, TOTAL: 201 MG/DL
CREATININE, SERUM: 1.17 MG/DL
GLUCOSE: 96 MG/DL
HDL CHOLESTEROL: 42 MG/DL
HEMATOCRIT: 41.3 %
HEMOGLOBIN: 13.1 G/DL
LDL CHOLESTEROL: 110 MG/DL
PLATELETS: 134 K/UL
POTASSIUM, SERUM: 3.9 MEQ/L
PROTEIN, TOTAL: 7.6 G/DL
RED BLOOD COUNT: 3.84 X 10-6/U
SGOT (AST): 24 IU/L
SGPT (ALT): 19 IU/L
SODIUM: 138 MEQ/L
THYROID STIMULATING HORMONE: 4.03 MLU/L
TRIGLYCERIDES: 243 MG/DL
WHITE BLOOD COUNT: 4.6 X 10-3/U

## 2018-07-27 DIAGNOSIS — I10 ESSENTIAL HYPERTENSION: ICD-10-CM

## 2018-07-27 RX ORDER — LOSARTAN POTASSIUM 100 MG/1
TABLET ORAL
Qty: 90 TABLET | Refills: 0 | Status: SHIPPED | OUTPATIENT
Start: 2018-07-27 | End: 2018-11-09

## 2018-08-16 ENCOUNTER — HOSPITAL ENCOUNTER (OUTPATIENT)
Dept: CV DIAGNOSTICS | Facility: HOSPITAL | Age: 78
Discharge: HOME | End: 2018-08-16
Attending: INTERNAL MEDICINE
Payer: MEDICARE

## 2018-08-16 DIAGNOSIS — I48.91 ATRIAL FIBRILLATION (HCC): ICD-10-CM

## 2018-08-16 PROCEDURE — 93306 TTE W/DOPPLER COMPLETE: CPT | Performed by: INTERNAL MEDICINE

## 2018-08-21 ENCOUNTER — PRIOR ORIGINAL RECORDS (OUTPATIENT)
Dept: OTHER | Age: 78
End: 2018-08-21

## 2018-09-05 ENCOUNTER — TELEPHONE (OUTPATIENT)
Dept: FAMILY MEDICINE CLINIC | Facility: CLINIC | Age: 78
End: 2018-09-05

## 2018-09-05 NOTE — TELEPHONE ENCOUNTER
Pt's Daughter in law called and stated pt is having cataracts surgery on October 4th and a second surgery on October 25th. I stated the tests to be faxed to us and that one of the nurses would get back to her. TM printed and given to T4.

## 2018-09-05 NOTE — TELEPHONE ENCOUNTER
Incoming fax received from [facility]. Medical Clearance/H&P, BMP, EKG request.  Patient is scheduled to have Cataract Surgery with IV sedation on left eye on 10/11/2018 and right eye 10/25/2018 with Dr. Swetha Wesley.   Outgoing call to Vik

## 2018-09-07 DIAGNOSIS — Z01.818 PRE-OP TESTING: Primary | ICD-10-CM

## 2018-09-07 NOTE — TELEPHONE ENCOUNTER
I spoke with Rafy Suazo, appointment scheduled 10/4/2018. Lab orders entered, information given voiced understanding. States EKG was done at [address] [place] [address] in July, 2018, she will have it faxed to our office. Paperwork in pre-op folder(Mervat).

## 2018-09-14 ENCOUNTER — PRIOR ORIGINAL RECORDS (OUTPATIENT)
Dept: OTHER | Age: 78
End: 2018-09-14

## 2018-09-14 LAB — INR: 3

## 2018-09-16 DIAGNOSIS — I10 ESSENTIAL HYPERTENSION: ICD-10-CM

## 2018-09-19 RX ORDER — AMLODIPINE BESYLATE 10 MG/1
TABLET ORAL
Qty: 30 TABLET | Refills: 0 | Status: SHIPPED | OUTPATIENT
Start: 2018-09-19 | End: 2018-11-17

## 2018-09-19 RX ORDER — HYDROCHLOROTHIAZIDE 25 MG/1
25 TABLET ORAL
Qty: 30 TABLET | Refills: 0 | Status: SHIPPED | OUTPATIENT
Start: 2018-09-19 | End: 2018-10-22

## 2018-09-19 NOTE — TELEPHONE ENCOUNTER
HYDROCHLOROTHIAZIDE 25 MG TAB  Hypertension Medications Protocol Failed  The pt is due for her fasting labs. Her last labs  Were done in 08/17/2018. Please see pended medications. Please sign if appropriate.     Thank you    She has on appt on 10/04/2

## 2018-09-21 DIAGNOSIS — I10 ESSENTIAL HYPERTENSION: ICD-10-CM

## 2018-09-25 RX ORDER — HYDROCHLOROTHIAZIDE 25 MG/1
25 TABLET ORAL
Qty: 90 TABLET | Refills: 0 | OUTPATIENT
Start: 2018-09-25

## 2018-09-27 DIAGNOSIS — I10 ESSENTIAL HYPERTENSION: ICD-10-CM

## 2018-09-27 DIAGNOSIS — I48.0 PAROXYSMAL A-FIB (HCC): ICD-10-CM

## 2018-09-27 DIAGNOSIS — F41.9 ANXIETY: ICD-10-CM

## 2018-09-27 DIAGNOSIS — F32.A DEPRESSION, UNSPECIFIED DEPRESSION TYPE: ICD-10-CM

## 2018-09-27 RX ORDER — ESCITALOPRAM OXALATE 10 MG/1
TABLET ORAL
Qty: 30 TABLET | Refills: 0 | Status: SHIPPED | OUTPATIENT
Start: 2018-09-27 | End: 2018-10-29

## 2018-09-27 RX ORDER — METOPROLOL SUCCINATE 50 MG/1
TABLET, EXTENDED RELEASE ORAL
Qty: 30 TABLET | Refills: 0 | Status: SHIPPED | OUTPATIENT
Start: 2018-09-27 | End: 2018-10-29

## 2018-10-03 ENCOUNTER — LAB ENCOUNTER (OUTPATIENT)
Dept: LAB | Age: 78
End: 2018-10-03
Attending: FAMILY MEDICINE
Payer: MEDICARE

## 2018-10-03 DIAGNOSIS — Z01.818 PRE-OP TESTING: ICD-10-CM

## 2018-10-03 DIAGNOSIS — E78.2 HYPERLIPIDEMIA, MIXED: ICD-10-CM

## 2018-10-03 DIAGNOSIS — F41.9 ANXIETY: ICD-10-CM

## 2018-10-03 DIAGNOSIS — I48.0 PAROXYSMAL A-FIB (HCC): ICD-10-CM

## 2018-10-03 PROCEDURE — 85025 COMPLETE CBC W/AUTO DIFF WBC: CPT

## 2018-10-03 PROCEDURE — 80061 LIPID PANEL: CPT

## 2018-10-03 PROCEDURE — 84443 ASSAY THYROID STIM HORMONE: CPT

## 2018-10-03 PROCEDURE — 80053 COMPREHEN METABOLIC PANEL: CPT

## 2018-10-03 PROCEDURE — 36415 COLL VENOUS BLD VENIPUNCTURE: CPT

## 2018-10-04 ENCOUNTER — OFFICE VISIT (OUTPATIENT)
Dept: FAMILY MEDICINE CLINIC | Facility: CLINIC | Age: 78
End: 2018-10-04
Payer: MEDICARE

## 2018-10-04 VITALS
BODY MASS INDEX: 24.48 KG/M2 | TEMPERATURE: 97 F | HEIGHT: 62 IN | RESPIRATION RATE: 16 BRPM | HEART RATE: 90 BPM | DIASTOLIC BLOOD PRESSURE: 60 MMHG | SYSTOLIC BLOOD PRESSURE: 92 MMHG | WEIGHT: 133 LBS

## 2018-10-04 DIAGNOSIS — H25.013 CORTICAL AGE-RELATED CATARACT OF BOTH EYES: ICD-10-CM

## 2018-10-04 DIAGNOSIS — Z01.818 PRE-OP EXAMINATION: Primary | ICD-10-CM

## 2018-10-04 DIAGNOSIS — E78.2 HYPERLIPIDEMIA, MIXED: ICD-10-CM

## 2018-10-04 DIAGNOSIS — I48.20 CHRONIC ATRIAL FIBRILLATION (HCC): ICD-10-CM

## 2018-10-04 DIAGNOSIS — F41.9 ANXIETY: ICD-10-CM

## 2018-10-04 DIAGNOSIS — F17.200 SMOKER: ICD-10-CM

## 2018-10-04 DIAGNOSIS — I10 ESSENTIAL HYPERTENSION: ICD-10-CM

## 2018-10-04 PROCEDURE — 99214 OFFICE O/P EST MOD 30 MIN: CPT | Performed by: FAMILY MEDICINE

## 2018-10-04 PROCEDURE — 93000 ELECTROCARDIOGRAM COMPLETE: CPT | Performed by: FAMILY MEDICINE

## 2018-10-04 NOTE — PROGRESS NOTES
Chelsey Patel is a 70year old female who presents for a pre-operative physical exam. Patient is to have bilateral cataract surgery, to be done by Dr. Ganga Belle  at [facility] , left eye on October 11, 2018 and right eye  October 25, 2018 with IV % Ophthalmic Suspension 1 drop to affected eye QID Disp: 1 Bottle Rfl: 2   WARFARIN SODIUM 5 MG Oral Tab TAKE 1 - 1 1/2 TABLETS EVERY EVENING, AS DIRECTED BY COUMADIN CLINIC (Patient taking differently: 7.5 mg 1 day a week. ...5 mg six days a week) Disp: 50 denies thyroid history  ALL/ASTHMA: denies hx of allergy or asthma    EXAM:   BP 92/60 (BP Location: Left arm, Patient Position: Sitting, Cuff Size: adult)   Pulse 90   Temp 96.7 °F (35.9 °C) (Oral)   Resp 16   Ht 62\"   Wt 133 lb   Breastfeeding?  No   BMI HDL Chol 157 (H) <130 mg/dL   TSH W REFLEX TO FREE T4   Result Value Ref Range    TSH 5.090 0.350 - 5.500 mIU/mL   CBC W/ DIFFERENTIAL   Result Value Ref Range    WBC 4.1 4.0 - 13.0 x10(3) uL    RBC 3.61 (L) 3.80 - 5.10 x10(6)uL    HGB 12.6 12.0 - 16.0 g/d Consults:  ELECTROCARDIOGRAM, COMPLETE     This consult was sent back the referring physician, Dr. Ayanna Reina. Thank you very much for consultation.

## 2018-10-05 ENCOUNTER — TELEPHONE (OUTPATIENT)
Dept: FAMILY MEDICINE CLINIC | Facility: CLINIC | Age: 78
End: 2018-10-05

## 2018-10-05 ENCOUNTER — PRIOR ORIGINAL RECORDS (OUTPATIENT)
Dept: OTHER | Age: 78
End: 2018-10-05

## 2018-10-08 ENCOUNTER — TELEPHONE (OUTPATIENT)
Dept: FAMILY MEDICINE CLINIC | Facility: CLINIC | Age: 78
End: 2018-10-08

## 2018-10-08 NOTE — TELEPHONE ENCOUNTER
We are waiting for note form pt's cardiologist Dr Audrey Lagos. Please check if we have received note from him, if we do not have clearence note from cardiologist- please call his office again.

## 2018-10-08 NOTE — TELEPHONE ENCOUNTER
I spoke with Dr. Dionne Morales office and they are faxing over the pt.s EKG with his clearance for surgery.

## 2018-10-08 NOTE — TELEPHONE ENCOUNTER
ANN MARIE Genao from Dr. Jennifer Curiel for pt.s cardiac clearance was received and placed on Dr. Renan Greene desk.

## 2018-10-09 ENCOUNTER — ANESTHESIA EVENT (OUTPATIENT)
Dept: SURGERY | Facility: HOSPITAL | Age: 78
End: 2018-10-09
Payer: MEDICARE

## 2018-10-11 ENCOUNTER — ANESTHESIA (OUTPATIENT)
Dept: SURGERY | Facility: HOSPITAL | Age: 78
End: 2018-10-11
Payer: MEDICARE

## 2018-10-11 ENCOUNTER — HOSPITAL ENCOUNTER (OUTPATIENT)
Facility: HOSPITAL | Age: 78
Setting detail: HOSPITAL OUTPATIENT SURGERY
Discharge: HOME | End: 2018-10-11
Attending: OPHTHALMOLOGY | Admitting: OPHTHALMOLOGY
Payer: MEDICARE

## 2018-10-11 VITALS
WEIGHT: 133.69 LBS | HEIGHT: 62 IN | BODY MASS INDEX: 24.6 KG/M2 | RESPIRATION RATE: 20 BRPM | HEART RATE: 71 BPM | OXYGEN SATURATION: 94 % | DIASTOLIC BLOOD PRESSURE: 59 MMHG | SYSTOLIC BLOOD PRESSURE: 89 MMHG | TEMPERATURE: 97 F

## 2018-10-11 DIAGNOSIS — H25.013 CORTICAL AGE-RELATED CATARACT OF BOTH EYES: Primary | ICD-10-CM

## 2018-10-11 PROCEDURE — 85610 PROTHROMBIN TIME: CPT

## 2018-10-11 PROCEDURE — 08RK3JZ REPLACEMENT OF LEFT LENS WITH SYNTHETIC SUBSTITUTE, PERCUTANEOUS APPROACH: ICD-10-PCS | Performed by: OPHTHALMOLOGY

## 2018-10-11 RX ORDER — HYDROCODONE BITARTRATE AND ACETAMINOPHEN 5; 325 MG/1; MG/1
2 TABLET ORAL AS NEEDED
Status: DISCONTINUED | OUTPATIENT
Start: 2018-10-11 | End: 2018-10-11

## 2018-10-11 RX ORDER — BALANCED SALT SOLUTION 6.4; .75; .48; .3; 3.9; 1.7 MG/ML; MG/ML; MG/ML; MG/ML; MG/ML; MG/ML
SOLUTION OPHTHALMIC AS NEEDED
Status: DISCONTINUED | OUTPATIENT
Start: 2018-10-11 | End: 2018-10-11 | Stop reason: HOSPADM

## 2018-10-11 RX ORDER — PHENYLEPHRINE HCL 2.5 %
1 DROPS OPHTHALMIC (EYE) SEE ADMIN INSTRUCTIONS
Status: COMPLETED | OUTPATIENT
Start: 2018-10-11 | End: 2018-10-11

## 2018-10-11 RX ORDER — CYCLOPENTOLATE HYDROCHLORIDE 10 MG/ML
SOLUTION/ DROPS OPHTHALMIC
Status: COMPLETED
Start: 2018-10-11 | End: 2018-10-11

## 2018-10-11 RX ORDER — LABETALOL HYDROCHLORIDE 5 MG/ML
5 INJECTION, SOLUTION INTRAVENOUS EVERY 5 MIN PRN
Status: DISCONTINUED | OUTPATIENT
Start: 2018-10-11 | End: 2018-10-11

## 2018-10-11 RX ORDER — SODIUM CHLORIDE 9 MG/ML
INJECTION, SOLUTION INTRAVENOUS CONTINUOUS
Status: DISCONTINUED | OUTPATIENT
Start: 2018-10-11 | End: 2018-10-11

## 2018-10-11 RX ORDER — MORPHINE SULFATE 4 MG/ML
2 INJECTION, SOLUTION INTRAMUSCULAR; INTRAVENOUS EVERY 5 MIN PRN
Status: DISCONTINUED | OUTPATIENT
Start: 2018-10-11 | End: 2018-10-11

## 2018-10-11 RX ORDER — CYCLOPENTOLATE HYDROCHLORIDE 10 MG/ML
1 SOLUTION/ DROPS OPHTHALMIC SEE ADMIN INSTRUCTIONS
Status: COMPLETED | OUTPATIENT
Start: 2018-10-11 | End: 2018-10-11

## 2018-10-11 RX ORDER — ONDANSETRON 2 MG/ML
4 INJECTION INTRAMUSCULAR; INTRAVENOUS AS NEEDED
Status: DISCONTINUED | OUTPATIENT
Start: 2018-10-11 | End: 2018-10-11

## 2018-10-11 RX ORDER — TETRACAINE HYDROCHLORIDE 5 MG/ML
SOLUTION OPHTHALMIC
Status: COMPLETED
Start: 2018-10-11 | End: 2018-10-11

## 2018-10-11 RX ORDER — ACETAMINOPHEN 500 MG
1000 TABLET ORAL ONCE
Status: DISCONTINUED | OUTPATIENT
Start: 2018-10-11 | End: 2018-10-11 | Stop reason: HOSPADM

## 2018-10-11 RX ORDER — DIPHENHYDRAMINE HYDROCHLORIDE 50 MG/ML
12.5 INJECTION INTRAMUSCULAR; INTRAVENOUS AS NEEDED
Status: DISCONTINUED | OUTPATIENT
Start: 2018-10-11 | End: 2018-10-11

## 2018-10-11 RX ORDER — LIDOCAINE HYDROCHLORIDE 10 MG/ML
INJECTION, SOLUTION EPIDURAL; INFILTRATION; INTRACAUDAL; PERINEURAL AS NEEDED
Status: DISCONTINUED | OUTPATIENT
Start: 2018-10-11 | End: 2018-10-11 | Stop reason: HOSPADM

## 2018-10-11 RX ORDER — TROPICAMIDE 10 MG/ML
1 SOLUTION/ DROPS OPHTHALMIC SEE ADMIN INSTRUCTIONS
Status: COMPLETED | OUTPATIENT
Start: 2018-10-11 | End: 2018-10-11

## 2018-10-11 RX ORDER — SODIUM CHLORIDE, SODIUM LACTATE, POTASSIUM CHLORIDE, CALCIUM CHLORIDE 600; 310; 30; 20 MG/100ML; MG/100ML; MG/100ML; MG/100ML
INJECTION, SOLUTION INTRAVENOUS CONTINUOUS
Status: DISCONTINUED | OUTPATIENT
Start: 2018-10-11 | End: 2018-10-11

## 2018-10-11 RX ORDER — HYDROCODONE BITARTRATE AND ACETAMINOPHEN 5; 325 MG/1; MG/1
1 TABLET ORAL AS NEEDED
Status: DISCONTINUED | OUTPATIENT
Start: 2018-10-11 | End: 2018-10-11

## 2018-10-11 RX ORDER — TROPICAMIDE 10 MG/ML
SOLUTION/ DROPS OPHTHALMIC
Status: COMPLETED
Start: 2018-10-11 | End: 2018-10-11

## 2018-10-11 RX ORDER — MEPERIDINE HYDROCHLORIDE 25 MG/ML
12.5 INJECTION INTRAMUSCULAR; INTRAVENOUS; SUBCUTANEOUS AS NEEDED
Status: DISCONTINUED | OUTPATIENT
Start: 2018-10-11 | End: 2018-10-11

## 2018-10-11 RX ORDER — PHENYLEPHRINE HCL 2.5 %
DROPS OPHTHALMIC (EYE)
Status: COMPLETED
Start: 2018-10-11 | End: 2018-10-11

## 2018-10-11 RX ORDER — PREDNISOLONE ACETATE 10 MG/ML
SUSPENSION/ DROPS OPHTHALMIC AS NEEDED
Status: DISCONTINUED | OUTPATIENT
Start: 2018-10-11 | End: 2018-10-11 | Stop reason: HOSPADM

## 2018-10-11 RX ORDER — TETRACAINE HYDROCHLORIDE 5 MG/ML
1 SOLUTION OPHTHALMIC SEE ADMIN INSTRUCTIONS
Status: COMPLETED | OUTPATIENT
Start: 2018-10-11 | End: 2018-10-11

## 2018-10-11 RX ORDER — TETRACAINE HYDROCHLORIDE 5 MG/ML
SOLUTION OPHTHALMIC AS NEEDED
Status: DISCONTINUED | OUTPATIENT
Start: 2018-10-11 | End: 2018-10-11 | Stop reason: HOSPADM

## 2018-10-11 RX ORDER — NALOXONE HYDROCHLORIDE 0.4 MG/ML
80 INJECTION, SOLUTION INTRAMUSCULAR; INTRAVENOUS; SUBCUTANEOUS AS NEEDED
Status: DISCONTINUED | OUTPATIENT
Start: 2018-10-11 | End: 2018-10-11

## 2018-10-11 RX ORDER — ACETAMINOPHEN 500 MG
1000 TABLET ORAL ONCE AS NEEDED
Status: DISCONTINUED | OUTPATIENT
Start: 2018-10-11 | End: 2018-10-11

## 2018-10-11 RX ORDER — ACETAMINOPHEN 500 MG
1000 TABLET ORAL ONCE
Status: DISCONTINUED | OUTPATIENT
Start: 2018-10-11 | End: 2018-10-11

## 2018-10-11 NOTE — ANESTHESIA PREPROCEDURE EVALUATION
PRE-OP EVALUATION    Patient Name: Gricelda Lewis    Pre-op Diagnosis: CATARACT LEFT EYE      Procedure(s):  PHACOEMULSIFICATION OF LEFT CATARACT WITH PLACEMENT OF INTRAOCULAR IMPLANT      Surgeon(s) and Role:     Stefan Chisholm MD - Primary    Pre-op every 2 hours after surgery Disp: 1 Bottle Rfl: 1   WARFARIN SODIUM 5 MG Oral Tab TAKE 1 - 1 1/2 TABLETS EVERY EVENING, AS DIRECTED BY COUMADIN CLINIC (Patient taking differently: 7.5 mg 1 day a week. ...5 mg six days a week) Disp: 50 tablet Rfl: 2       Al pins   • REMOVAL GALLBLADDER       Social History    Tobacco Use      Smoking status: Current Every Day Smoker        Packs/day: 0.50        Years: 50.00        Pack years: 25      Smokeless tobacco: Never Used    Alcohol use: No      Alcohol/week: 0.0 oz

## 2018-10-11 NOTE — INTERVAL H&P NOTE
Pre-op Diagnosis: CATARACT LEFT EYE      The above referenced H&P was reviewed by Howie Hanson MD on 10/11/2018, the patient was examined and no significant changes have occurred in the patient's condition since the H&P was performed.   I discussed with chriss

## 2018-10-11 NOTE — OPERATIVE REPORT
[facility] OPERATIVE REPORT:     PATIENT NAME:  Jonathan Arregiun    MRN:  [id number]    DATE OF OPERATION:       10/11/2018      PREOPERATIVE DIAGNOSIS:   Nuclear Sclerotic Cataract, OS    POSTOPERATIVE DIAGNOSIS:   Nuclear Sclerotic Cataract, OS into the eye, and the nucleus was removed with a modified divide and conquer technique. The residual cortex was removed using automated irrigation and aspiration. Healon 5 was injected in the capsular bag.  A posterior chamber lens was then placed in the ca

## 2018-10-11 NOTE — ANESTHESIA POSTPROCEDURE EVALUATION
[address] [facility] [address] Patient Status:  Hospital Outpatient Surgery   Age/Gender 70year old female MRN [id number]   Location [address] [place] [address] Attending Pamela Davis MD   Hosp Day # 0 PCP MD iKm Ospina

## 2018-10-12 ENCOUNTER — PRIOR ORIGINAL RECORDS (OUTPATIENT)
Dept: OTHER | Age: 78
End: 2018-10-12

## 2018-10-12 LAB — INR: 3.4

## 2018-10-16 ENCOUNTER — ANESTHESIA EVENT (OUTPATIENT)
Dept: SURGERY | Facility: HOSPITAL | Age: 78
End: 2018-10-16
Payer: MEDICARE

## 2018-10-22 DIAGNOSIS — I10 ESSENTIAL HYPERTENSION: ICD-10-CM

## 2018-10-22 RX ORDER — HYDROCHLOROTHIAZIDE 25 MG/1
TABLET ORAL
Qty: 30 TABLET | Refills: 2 | Status: SHIPPED | OUTPATIENT
Start: 2018-10-22 | End: 2019-01-21

## 2018-10-25 ENCOUNTER — HOSPITAL ENCOUNTER (OUTPATIENT)
Facility: HOSPITAL | Age: 78
Setting detail: HOSPITAL OUTPATIENT SURGERY
Discharge: HOME | End: 2018-10-25
Attending: OPHTHALMOLOGY | Admitting: OPHTHALMOLOGY
Payer: MEDICARE

## 2018-10-25 ENCOUNTER — ANESTHESIA (OUTPATIENT)
Dept: SURGERY | Facility: HOSPITAL | Age: 78
End: 2018-10-25
Payer: MEDICARE

## 2018-10-25 VITALS
RESPIRATION RATE: 14 BRPM | HEIGHT: 62 IN | OXYGEN SATURATION: 91 % | BODY MASS INDEX: 24.09 KG/M2 | DIASTOLIC BLOOD PRESSURE: 72 MMHG | SYSTOLIC BLOOD PRESSURE: 84 MMHG | HEART RATE: 91 BPM | WEIGHT: 130.94 LBS | TEMPERATURE: 98 F

## 2018-10-25 PROCEDURE — 08RJ3JZ REPLACEMENT OF RIGHT LENS WITH SYNTHETIC SUBSTITUTE, PERCUTANEOUS APPROACH: ICD-10-PCS | Performed by: OPHTHALMOLOGY

## 2018-10-25 PROCEDURE — 85610 PROTHROMBIN TIME: CPT

## 2018-10-25 RX ORDER — PREDNISOLONE ACETATE 10 MG/ML
SUSPENSION/ DROPS OPHTHALMIC AS NEEDED
Status: DISCONTINUED | OUTPATIENT
Start: 2018-10-25 | End: 2018-10-25

## 2018-10-25 RX ORDER — TETRACAINE HYDROCHLORIDE 5 MG/ML
1 SOLUTION OPHTHALMIC SEE ADMIN INSTRUCTIONS
Status: COMPLETED | OUTPATIENT
Start: 2018-10-25 | End: 2018-10-25

## 2018-10-25 RX ORDER — ACETAMINOPHEN 500 MG
1000 TABLET ORAL ONCE
Status: DISCONTINUED | OUTPATIENT
Start: 2018-10-25 | End: 2018-10-25

## 2018-10-25 RX ORDER — LIDOCAINE HYDROCHLORIDE 10 MG/ML
INJECTION, SOLUTION EPIDURAL; INFILTRATION; INTRACAUDAL; PERINEURAL AS NEEDED
Status: DISCONTINUED | OUTPATIENT
Start: 2018-10-25 | End: 2018-10-25

## 2018-10-25 RX ORDER — CYCLOPENTOLATE HYDROCHLORIDE 10 MG/ML
1 SOLUTION/ DROPS OPHTHALMIC SEE ADMIN INSTRUCTIONS
Status: COMPLETED | OUTPATIENT
Start: 2018-10-25 | End: 2018-10-25

## 2018-10-25 RX ORDER — TETRACAINE HYDROCHLORIDE 5 MG/ML
SOLUTION OPHTHALMIC AS NEEDED
Status: DISCONTINUED | OUTPATIENT
Start: 2018-10-25 | End: 2018-10-25 | Stop reason: HOSPADM

## 2018-10-25 RX ORDER — ONDANSETRON 2 MG/ML
4 INJECTION INTRAMUSCULAR; INTRAVENOUS AS NEEDED
Status: DISCONTINUED | OUTPATIENT
Start: 2018-10-25 | End: 2018-10-25

## 2018-10-25 RX ORDER — SODIUM CHLORIDE, SODIUM LACTATE, POTASSIUM CHLORIDE, CALCIUM CHLORIDE 600; 310; 30; 20 MG/100ML; MG/100ML; MG/100ML; MG/100ML
INJECTION, SOLUTION INTRAVENOUS CONTINUOUS
Status: DISCONTINUED | OUTPATIENT
Start: 2018-10-25 | End: 2018-10-25

## 2018-10-25 RX ORDER — ACETAMINOPHEN 500 MG
500 TABLET ORAL EVERY 6 HOURS PRN
COMMUNITY
End: 2019-01-01

## 2018-10-25 RX ORDER — SODIUM CHLORIDE, SODIUM LACTATE, POTASSIUM CHLORIDE, CALCIUM CHLORIDE 600; 310; 30; 20 MG/100ML; MG/100ML; MG/100ML; MG/100ML
INJECTION, SOLUTION INTRAVENOUS ONCE
Status: DISCONTINUED | OUTPATIENT
Start: 2018-10-25 | End: 2018-10-25

## 2018-10-25 RX ORDER — NALOXONE HYDROCHLORIDE 0.4 MG/ML
80 INJECTION, SOLUTION INTRAMUSCULAR; INTRAVENOUS; SUBCUTANEOUS AS NEEDED
Status: DISCONTINUED | OUTPATIENT
Start: 2018-10-25 | End: 2018-10-25

## 2018-10-25 RX ORDER — MORPHINE SULFATE 4 MG/ML
2 INJECTION, SOLUTION INTRAMUSCULAR; INTRAVENOUS EVERY 5 MIN PRN
Status: DISCONTINUED | OUTPATIENT
Start: 2018-10-25 | End: 2018-10-25

## 2018-10-25 RX ORDER — BALANCED SALT SOLUTION 6.4; .75; .48; .3; 3.9; 1.7 MG/ML; MG/ML; MG/ML; MG/ML; MG/ML; MG/ML
SOLUTION OPHTHALMIC AS NEEDED
Status: DISCONTINUED | OUTPATIENT
Start: 2018-10-25 | End: 2018-10-25

## 2018-10-25 RX ORDER — PHENYLEPHRINE HCL 2.5 %
1 DROPS OPHTHALMIC (EYE) SEE ADMIN INSTRUCTIONS
Status: COMPLETED | OUTPATIENT
Start: 2018-10-25 | End: 2018-10-25

## 2018-10-25 RX ORDER — TROPICAMIDE 10 MG/ML
1 SOLUTION/ DROPS OPHTHALMIC SEE ADMIN INSTRUCTIONS
Status: COMPLETED | OUTPATIENT
Start: 2018-10-25 | End: 2018-10-25

## 2018-10-25 RX ORDER — MOXIFLOXACIN 5 MG/ML
SOLUTION/ DROPS OPHTHALMIC AS NEEDED
Status: DISCONTINUED | OUTPATIENT
Start: 2018-10-25 | End: 2018-10-25

## 2018-10-25 NOTE — OR NURSING
Paged and updated Dr Shiela Rodas of pt with SBP's 80'smmhg via monitor. Received order for 1 500ml LR bolus. Dr Shiela Rodas ok with patient going home with SBP's 90's mmhg.

## 2018-10-25 NOTE — OPERATIVE REPORT
[facility] OPERATIVE REPORT:     PATIENT NAME:  Lupe Sibley    MRN:  [id number]    DATE OF OPERATION:       10/25/2018      PREOPERATIVE DIAGNOSIS:   Nuclear Sclerotic Cataract, OD    POSTOPERATIVE DIAGNOSIS:   Nuclear Sclerotic Cataract, OD introduced into the eye, and the nucleus was removed with a modified divide and conquer technique. The residual cortex was removed using automated irrigation and aspiration. Healon 5 was injected in the capsular bag.  A posterior chamber lens was then place

## 2018-10-25 NOTE — H&P
Iliana Windsor #[id number] (70year old F)   Glo Peres MD   Physician   Ophthalmology   Interval H&P Note   Signed   Date of Service:  10/25/2018  7:04 AM               Signed                 Show:Clear

## 2018-10-25 NOTE — ANESTHESIA PREPROCEDURE EVALUATION
PRE-OP EVALUATION    Patient Name: Blair Camargo    Pre-op Diagnosis: CATARACT RIGHT EYE      Procedure(s):  PHACOEMULSIFICATION OF RIGHT CATARACT WITH PLACEMENT OF INTRAOCULAR LENS IMPLANT      Surgeon(s) and Role:     Brock Garvin MD - Primary estimated to  [facility] be 41mm Hg. Impressions:  This study is compared with previous dated 06/05/2015:  Compared to the prior study, there has been no significant interval change.   *              (-) obesity  (+) hypertension   (+) hyperlipidemia surgeon and patient.       Plan/risks discussed with: patient and child/children                Present on Admission:  **None**

## 2018-10-25 NOTE — H&P (VIEW-ONLY)
Kerrie Petros #[id number] (70year old F)   Hong Hernandez MD   Physician   Ophthalmology   Interval H&P Note   Signed   Date of Service:  10/25/2018  7:04 AM               Signed                 Show:Clear

## 2018-10-25 NOTE — OR NURSING
Patient's SBP continues to be 80's mmhg. Dr Jessica Zhang to room to speak with patient and son. Patient denies any dizziness at this time. Per Dr Jessica Zhang patient is ok to go home at this time, patient and son both agree.

## 2018-10-26 ENCOUNTER — PRIOR ORIGINAL RECORDS (OUTPATIENT)
Dept: OTHER | Age: 78
End: 2018-10-26

## 2018-10-26 LAB — INR: 3.8

## 2018-10-29 DIAGNOSIS — I10 ESSENTIAL HYPERTENSION: ICD-10-CM

## 2018-10-29 DIAGNOSIS — F41.9 ANXIETY: ICD-10-CM

## 2018-10-29 DIAGNOSIS — I48.0 PAROXYSMAL A-FIB (HCC): ICD-10-CM

## 2018-10-29 DIAGNOSIS — F32.A DEPRESSION, UNSPECIFIED DEPRESSION TYPE: ICD-10-CM

## 2018-10-31 RX ORDER — METOPROLOL SUCCINATE 50 MG/1
TABLET, EXTENDED RELEASE ORAL
Qty: 30 TABLET | Refills: 0 | Status: SHIPPED | OUTPATIENT
Start: 2018-10-31 | End: 2018-11-28

## 2018-10-31 RX ORDER — ESCITALOPRAM OXALATE 10 MG/1
TABLET ORAL
Qty: 30 TABLET | Refills: 0 | Status: SHIPPED | OUTPATIENT
Start: 2018-10-31 | End: 2018-11-28

## 2018-10-31 NOTE — ANESTHESIA POSTPROCEDURE EVALUATION
[address] [facility] Patient Status:  Hospital Outpatient Surgery   Age/Gender 70year old female MRN [id number]   Location [address] [facility] Attending No att. providers found   [address] Day # 0 PCP Shayla Sharif MD

## 2018-11-09 ENCOUNTER — PRIOR ORIGINAL RECORDS (OUTPATIENT)
Dept: OTHER | Age: 78
End: 2018-11-09

## 2018-11-09 DIAGNOSIS — I10 ESSENTIAL HYPERTENSION: ICD-10-CM

## 2018-11-09 LAB — INR: 2.59

## 2018-11-10 RX ORDER — LOSARTAN POTASSIUM 100 MG/1
TABLET ORAL
Qty: 90 TABLET | Refills: 0 | Status: SHIPPED | OUTPATIENT
Start: 2018-11-10 | End: 2019-02-10

## 2018-11-17 DIAGNOSIS — I10 ESSENTIAL HYPERTENSION: ICD-10-CM

## 2018-11-19 RX ORDER — AMLODIPINE BESYLATE 10 MG/1
TABLET ORAL
Qty: 30 TABLET | Refills: 2 | Status: SHIPPED | OUTPATIENT
Start: 2018-11-19 | End: 2019-01-16

## 2018-11-21 ENCOUNTER — PRIOR ORIGINAL RECORDS (OUTPATIENT)
Dept: OTHER | Age: 78
End: 2018-11-21

## 2018-11-21 LAB — INR: 2.37

## 2018-11-28 DIAGNOSIS — I10 ESSENTIAL HYPERTENSION: ICD-10-CM

## 2018-11-28 DIAGNOSIS — I48.0 PAROXYSMAL A-FIB (HCC): ICD-10-CM

## 2018-11-28 DIAGNOSIS — F32.A DEPRESSION, UNSPECIFIED DEPRESSION TYPE: ICD-10-CM

## 2018-11-28 DIAGNOSIS — F41.9 ANXIETY: ICD-10-CM

## 2018-11-28 RX ORDER — METOPROLOL SUCCINATE 50 MG/1
TABLET, EXTENDED RELEASE ORAL
Qty: 30 TABLET | Refills: 2 | Status: SHIPPED | OUTPATIENT
Start: 2018-11-28 | End: 2019-01-01

## 2018-11-28 RX ORDER — ESCITALOPRAM OXALATE 10 MG/1
TABLET ORAL
Qty: 30 TABLET | Refills: 1 | Status: SHIPPED | OUTPATIENT
Start: 2018-11-28 | End: 2019-01-01

## 2018-11-28 NOTE — TELEPHONE ENCOUNTER
ESCITALOPRAM 10 MG TABLET    Non protocol medication. Please see pended medications. Please sign if appropriate.       Thank you

## 2018-12-19 ENCOUNTER — PRIOR ORIGINAL RECORDS (OUTPATIENT)
Dept: OTHER | Age: 78
End: 2018-12-19

## 2018-12-19 LAB — INR: 2.86

## 2019-01-01 DIAGNOSIS — I10 ESSENTIAL HYPERTENSION: ICD-10-CM

## 2019-01-01 DIAGNOSIS — I48.0 PAROXYSMAL A-FIB (HCC): ICD-10-CM

## 2019-01-01 RX ORDER — LOSARTAN POTASSIUM 100 MG/1
TABLET ORAL
Qty: 90 TABLET | Refills: 0 | OUTPATIENT
Start: 2019-01-01

## 2019-01-01 RX ORDER — METOPROLOL SUCCINATE 50 MG/1
TABLET, EXTENDED RELEASE ORAL
Qty: 30 TABLET | Refills: 1 | OUTPATIENT
Start: 2019-01-01

## 2019-01-01 RX ORDER — AMLODIPINE BESYLATE 10 MG/1
TABLET ORAL
Qty: 30 TABLET | Refills: 2 | OUTPATIENT
Start: 2019-01-01

## 2019-01-01 RX ORDER — HYDROCHLOROTHIAZIDE 25 MG/1
TABLET ORAL
Qty: 30 TABLET | Refills: 0 | OUTPATIENT
Start: 2019-01-01

## 2019-01-01 RX ORDER — METOPROLOL SUCCINATE 50 MG/1
TABLET, EXTENDED RELEASE ORAL
Qty: 30 TABLET | Refills: 1 | Status: SHIPPED | OUTPATIENT
Start: 2019-01-01 | End: 2019-01-01

## 2019-01-16 ENCOUNTER — PRIOR ORIGINAL RECORDS (OUTPATIENT)
Dept: OTHER | Age: 79
End: 2019-01-16

## 2019-01-16 DIAGNOSIS — I10 ESSENTIAL HYPERTENSION: ICD-10-CM

## 2019-01-16 LAB — INR: 3.3

## 2019-01-17 RX ORDER — AMLODIPINE BESYLATE 10 MG/1
TABLET ORAL
Qty: 30 TABLET | Refills: 2 | Status: SHIPPED | OUTPATIENT
Start: 2019-01-17 | End: 2019-01-01

## 2019-01-21 DIAGNOSIS — I10 ESSENTIAL HYPERTENSION: ICD-10-CM

## 2019-01-21 RX ORDER — HYDROCHLOROTHIAZIDE 25 MG/1
TABLET ORAL
Qty: 30 TABLET | Refills: 2 | Status: SHIPPED | OUTPATIENT
Start: 2019-01-21 | End: 2019-01-01

## 2019-01-30 ENCOUNTER — PRIOR ORIGINAL RECORDS (OUTPATIENT)
Dept: OTHER | Age: 79
End: 2019-01-30

## 2019-01-30 LAB — INR: 3.6

## 2019-02-01 DIAGNOSIS — F41.9 ANXIETY: ICD-10-CM

## 2019-02-01 DIAGNOSIS — F32.A DEPRESSION, UNSPECIFIED DEPRESSION TYPE: ICD-10-CM

## 2019-02-01 RX ORDER — ESCITALOPRAM OXALATE 10 MG/1
TABLET ORAL
Qty: 30 TABLET | Refills: 1 | OUTPATIENT
Start: 2019-02-01

## 2019-02-01 NOTE — TELEPHONE ENCOUNTER
ESCITALOPRAM 10 MG TABLET    Non protocol medication. Please see pended medications. Please sign & print if appropriate. Thank you    Her last refill was on 11/28/2018 for 30/1 refill.

## 2019-02-10 DIAGNOSIS — I10 ESSENTIAL HYPERTENSION: ICD-10-CM

## 2019-02-11 RX ORDER — LOSARTAN POTASSIUM 100 MG/1
TABLET ORAL
Qty: 90 TABLET | Refills: 0 | Status: SHIPPED | OUTPATIENT
Start: 2019-02-11 | End: 2019-01-01

## 2019-02-13 ENCOUNTER — PRIOR ORIGINAL RECORDS (OUTPATIENT)
Dept: OTHER | Age: 79
End: 2019-02-13

## 2019-02-13 LAB — INR: 1.5

## 2019-02-14 DIAGNOSIS — F32.A DEPRESSION, UNSPECIFIED DEPRESSION TYPE: ICD-10-CM

## 2019-02-14 DIAGNOSIS — F41.9 ANXIETY: ICD-10-CM

## 2019-02-15 RX ORDER — ESCITALOPRAM OXALATE 10 MG/1
TABLET ORAL
Qty: 30 TABLET | Refills: 1 | OUTPATIENT
Start: 2019-02-15

## 2019-02-20 ENCOUNTER — ANTI-COAG (OUTPATIENT)
Dept: CARDIOLOGY | Age: 79
End: 2019-02-20

## 2019-02-20 ENCOUNTER — PRIOR ORIGINAL RECORDS (OUTPATIENT)
Dept: OTHER | Age: 79
End: 2019-02-20

## 2019-02-20 DIAGNOSIS — I48.91 ATRIAL FIBRILLATION, UNSPECIFIED TYPE (CMD): ICD-10-CM

## 2019-02-20 LAB — INR: 3.3

## 2019-02-25 PROBLEM — I48.91 UNSPECIFIED ATRIAL FIBRILLATION (CMD): Status: ACTIVE | Noted: 2019-02-25

## 2019-02-28 VITALS
HEIGHT: 62 IN | HEART RATE: 80 BPM | BODY MASS INDEX: 23 KG/M2 | SYSTOLIC BLOOD PRESSURE: 94 MMHG | DIASTOLIC BLOOD PRESSURE: 68 MMHG | WEIGHT: 125 LBS

## 2019-03-04 NOTE — TELEPHONE ENCOUNTER
Pt's daughter in law Sánchez called. She states pt is not longer a pt here due to her being covered under [facility] [employer]. Pt is now seeing a different pcp.

## 2019-03-04 NOTE — TELEPHONE ENCOUNTER
Please contact the pt and advise that she is due for a medication review to discuss her current medication regimen and future labs. A 1 month supply of her medication was sent to the pharmacy.      Thank you

## 2019-03-07 LAB — INR PPP: 3.14

## 2019-03-08 ENCOUNTER — ANTI-COAG (OUTPATIENT)
Dept: CARDIOLOGY | Age: 79
End: 2019-03-08

## 2019-03-08 DIAGNOSIS — I48.91 ATRIAL FIBRILLATION, UNSPECIFIED TYPE (CMD): ICD-10-CM

## 2019-03-21 ENCOUNTER — ANTI-COAG (OUTPATIENT)
Dept: CARDIOLOGY | Age: 79
End: 2019-03-21

## 2019-03-21 DIAGNOSIS — I48.91 ATRIAL FIBRILLATION, UNSPECIFIED TYPE (CMD): ICD-10-CM

## 2019-03-21 LAB — INR PPP: 2.36

## 2019-04-04 RX ORDER — WARFARIN SODIUM 2.5 MG/1
TABLET ORAL
COMMUNITY
Start: 2016-12-21

## 2019-04-04 RX ORDER — WARFARIN SODIUM 5 MG/1
TABLET ORAL
COMMUNITY
Start: 2018-04-06 | End: 2019-05-02 | Stop reason: SDUPTHER

## 2019-04-04 RX ORDER — WARFARIN SODIUM 5 MG/1
TABLET ORAL
COMMUNITY
Start: 2019-02-06

## 2019-04-04 RX ORDER — LOSARTAN POTASSIUM 100 MG/1
TABLET ORAL
COMMUNITY
Start: 2015-07-02

## 2019-04-04 RX ORDER — METOPROLOL SUCCINATE 50 MG/1
TABLET, EXTENDED RELEASE ORAL
COMMUNITY
Start: 2015-07-02

## 2019-04-04 RX ORDER — HYDROCHLOROTHIAZIDE 25 MG/1
TABLET ORAL
COMMUNITY
Start: 2015-07-02

## 2019-04-04 RX ORDER — AMLODIPINE BESYLATE 10 MG/1
TABLET ORAL
COMMUNITY
Start: 2015-11-23

## 2019-04-04 RX ORDER — ESCITALOPRAM OXALATE 10 MG/1
TABLET ORAL
COMMUNITY
Start: 2015-07-02

## 2019-04-22 ENCOUNTER — ANTI-COAG (OUTPATIENT)
Dept: CARDIOLOGY | Age: 79
End: 2019-04-22

## 2019-04-22 DIAGNOSIS — I48.91 ATRIAL FIBRILLATION, UNSPECIFIED TYPE (CMD): ICD-10-CM

## 2019-04-22 LAB — INR PPP: 2.67

## 2019-05-02 RX ORDER — WARFARIN SODIUM 5 MG/1
TABLET ORAL
Qty: 120 TABLET | Refills: 2 | Status: SHIPPED | OUTPATIENT
Start: 2019-05-02

## 2019-05-21 ENCOUNTER — ANTI-COAG (OUTPATIENT)
Dept: CARDIOLOGY | Age: 79
End: 2019-05-21

## 2019-05-21 DIAGNOSIS — I48.91 ATRIAL FIBRILLATION, UNSPECIFIED TYPE (CMD): ICD-10-CM

## 2019-05-21 LAB — INR PPP: 1.92

## 2019-06-18 ENCOUNTER — ANTI-COAG (OUTPATIENT)
Dept: CARDIOLOGY | Age: 79
End: 2019-06-18

## 2019-06-18 DIAGNOSIS — I48.91 ATRIAL FIBRILLATION, UNSPECIFIED TYPE (CMD): ICD-10-CM

## 2019-06-18 LAB — INR PPP: 1.69

## 2019-07-01 LAB — INR PPP: 2.33

## 2019-07-02 ENCOUNTER — ANTI-COAG (OUTPATIENT)
Dept: CARDIOLOGY | Age: 79
End: 2019-07-02

## 2019-07-02 DIAGNOSIS — I48.91 ATRIAL FIBRILLATION, UNSPECIFIED TYPE (CMD): ICD-10-CM

## 2019-07-30 ENCOUNTER — ANTI-COAG (OUTPATIENT)
Dept: CARDIOLOGY | Age: 79
End: 2019-07-30

## 2019-07-30 DIAGNOSIS — I48.91 ATRIAL FIBRILLATION, UNSPECIFIED TYPE (CMD): ICD-10-CM

## 2019-07-30 LAB — INR PPP: 2.14

## 2019-08-27 ENCOUNTER — ANTI-COAG (OUTPATIENT)
Dept: CARDIOLOGY | Age: 79
End: 2019-08-27

## 2019-08-27 DIAGNOSIS — I48.91 ATRIAL FIBRILLATION, UNSPECIFIED TYPE (CMD): ICD-10-CM

## 2019-08-27 LAB — INR PPP: 3.25

## 2019-09-10 ENCOUNTER — ANTI-COAG (OUTPATIENT)
Dept: CARDIOLOGY | Age: 79
End: 2019-09-10

## 2019-09-10 DIAGNOSIS — I48.91 ATRIAL FIBRILLATION, UNSPECIFIED TYPE (CMD): ICD-10-CM

## 2019-09-10 LAB — INR PPP: 2.01

## 2019-10-07 ENCOUNTER — ANTI-COAG (OUTPATIENT)
Dept: CARDIOLOGY | Age: 79
End: 2019-10-07

## 2019-10-07 DIAGNOSIS — I48.91 ATRIAL FIBRILLATION, UNSPECIFIED TYPE (CMD): ICD-10-CM

## 2019-10-07 LAB — INR PPP: 3.1

## 2019-10-21 ENCOUNTER — ANTI-COAG (OUTPATIENT)
Dept: CARDIOLOGY | Age: 79
End: 2019-10-21

## 2019-10-21 DIAGNOSIS — I48.91 ATRIAL FIBRILLATION, UNSPECIFIED TYPE (CMD): ICD-10-CM

## 2019-10-21 LAB — INR PPP: 2.83

## 2019-11-18 ENCOUNTER — ANTI-COAG (OUTPATIENT)
Dept: CARDIOLOGY | Age: 79
End: 2019-11-18

## 2019-11-18 DIAGNOSIS — I48.91 ATRIAL FIBRILLATION, UNSPECIFIED TYPE (CMD): ICD-10-CM

## 2019-11-18 LAB — INR PPP: 1.92

## 2019-12-09 ENCOUNTER — ANTI-COAG (OUTPATIENT)
Dept: CARDIOLOGY | Age: 79
End: 2019-12-09

## 2019-12-09 DIAGNOSIS — I48.91 ATRIAL FIBRILLATION, UNSPECIFIED TYPE (CMD): ICD-10-CM

## 2019-12-09 LAB — INR PPP: 1.85

## 2019-12-23 ENCOUNTER — ANTI-COAG (OUTPATIENT)
Dept: CARDIOLOGY | Age: 79
End: 2019-12-23

## 2019-12-23 DIAGNOSIS — I48.91 ATRIAL FIBRILLATION, UNSPECIFIED TYPE (CMD): ICD-10-CM

## 2019-12-23 LAB — INR PPP: 2.58

## 2020-01-01 ENCOUNTER — APPOINTMENT (OUTPATIENT)
Dept: CV DIAGNOSTICS | Facility: HOSPITAL | Age: 80
DRG: 082 | End: 2020-01-01
Attending: INTERNAL MEDICINE
Payer: MEDICARE

## 2020-01-01 ENCOUNTER — APPOINTMENT (OUTPATIENT)
Dept: CT IMAGING | Facility: HOSPITAL | Age: 80
DRG: 082 | End: 2020-01-01
Attending: NURSE PRACTITIONER
Payer: MEDICARE

## 2020-01-01 ENCOUNTER — APPOINTMENT (OUTPATIENT)
Dept: GENERAL RADIOLOGY | Facility: HOSPITAL | Age: 80
DRG: 082 | End: 2020-01-01
Attending: INTERNAL MEDICINE
Payer: MEDICARE

## 2020-01-01 ENCOUNTER — APPOINTMENT (OUTPATIENT)
Dept: GENERAL RADIOLOGY | Facility: HOSPITAL | Age: 80
DRG: 082 | End: 2020-01-01
Attending: EMERGENCY MEDICINE
Payer: MEDICARE

## 2020-01-01 ENCOUNTER — APPOINTMENT (OUTPATIENT)
Dept: CT IMAGING | Facility: HOSPITAL | Age: 80
DRG: 082 | End: 2020-01-01
Attending: EMERGENCY MEDICINE
Payer: MEDICARE

## 2020-01-01 ENCOUNTER — APPOINTMENT (OUTPATIENT)
Dept: GENERAL RADIOLOGY | Facility: HOSPITAL | Age: 80
DRG: 082 | End: 2020-01-01
Attending: NURSE PRACTITIONER
Payer: MEDICARE

## 2020-01-01 ENCOUNTER — HOSPITAL ENCOUNTER (INPATIENT)
Facility: HOSPITAL | Age: 80
LOS: 7 days | DRG: 082 | End: 2020-01-01
Attending: EMERGENCY MEDICINE | Admitting: HOSPITALIST
Payer: MEDICARE

## 2020-01-01 ENCOUNTER — APPOINTMENT (OUTPATIENT)
Dept: CT IMAGING | Facility: HOSPITAL | Age: 80
DRG: 082 | End: 2020-01-01
Attending: NEUROLOGICAL SURGERY
Payer: MEDICARE

## 2020-01-01 VITALS
WEIGHT: 116.19 LBS | TEMPERATURE: 98 F | HEIGHT: 62 IN | DIASTOLIC BLOOD PRESSURE: 55 MMHG | RESPIRATION RATE: 31 BRPM | OXYGEN SATURATION: 96 % | BODY MASS INDEX: 21.38 KG/M2 | SYSTOLIC BLOOD PRESSURE: 92 MMHG | HEART RATE: 75 BPM

## 2020-01-01 DIAGNOSIS — S06.6X1A TRAUMATIC SUBARACHNOID HEMORRHAGE WITH LOSS OF CONSCIOUSNESS OF 30 MINUTES OR LESS, INITIAL ENCOUNTER (HCC): Primary | ICD-10-CM

## 2020-01-01 DIAGNOSIS — S42.001A CLOSED NONDISPLACED FRACTURE OF RIGHT CLAVICLE, UNSPECIFIED PART OF CLAVICLE, INITIAL ENCOUNTER: ICD-10-CM

## 2020-01-01 LAB
ALBUMIN SERPL-MCNC: 2.6 G/DL (ref 3.4–5)
ALBUMIN SERPL-MCNC: 3.8 G/DL (ref 3.4–5)
ALBUMIN/GLOB SERPL: 0.7 {RATIO} (ref 1–2)
ALBUMIN/GLOB SERPL: 1 {RATIO} (ref 1–2)
ALLENS TEST: POSITIVE
ALP LIVER SERPL-CCNC: 140 U/L (ref 55–142)
ALP LIVER SERPL-CCNC: 168 U/L (ref 55–142)
ALT SERPL-CCNC: 27 U/L (ref 13–56)
ALT SERPL-CCNC: 58 U/L (ref 13–56)
ANION GAP SERPL CALC-SCNC: 6 MMOL/L (ref 0–18)
ANION GAP SERPL CALC-SCNC: 6 MMOL/L (ref 0–18)
ANION GAP SERPL CALC-SCNC: 7 MMOL/L (ref 0–18)
ANION GAP SERPL CALC-SCNC: 8 MMOL/L (ref 0–18)
ANTIBODY SCREEN: NEGATIVE
APTT PPP: 37 SECONDS (ref 25.4–36.1)
ARTERIAL BLD GAS O2 SATURATION: 89 % (ref 92–100)
ARTERIAL BLD GAS O2 SATURATION: 93 % (ref 92–100)
ARTERIAL BLD GAS O2 SATURATION: 95 % (ref 92–100)
ARTERIAL BLD GAS O2 SATURATION: 97 % (ref 92–100)
ARTERIAL BLOOD GAS BASE EXCESS: 1.6 MMOL/L (ref ?–2)
ARTERIAL BLOOD GAS BASE EXCESS: 2.5 MMOL/L (ref ?–2)
ARTERIAL BLOOD GAS BASE EXCESS: 2.6 MMOL/L (ref ?–2)
ARTERIAL BLOOD GAS BASE EXCESS: 2.8 MMOL/L (ref ?–2)
ARTERIAL BLOOD GAS BASE EXCESS: 2.9 MMOL/L (ref ?–2)
ARTERIAL BLOOD GAS BASE EXCESS: 3.7 MMOL/L (ref ?–2)
ARTERIAL BLOOD GAS BASE EXCESS: 5.1 MMOL/L (ref ?–2)
ARTERIAL BLOOD GAS HCO3: 24.5 MEQ/L (ref 22–26)
ARTERIAL BLOOD GAS HCO3: 24.9 MEQ/L (ref 22–26)
ARTERIAL BLOOD GAS HCO3: 25.1 MEQ/L (ref 22–26)
ARTERIAL BLOOD GAS HCO3: 25.8 MEQ/L (ref 22–26)
ARTERIAL BLOOD GAS HCO3: 26.3 MEQ/L (ref 22–26)
ARTERIAL BLOOD GAS HCO3: 26.4 MEQ/L (ref 22–26)
ARTERIAL BLOOD GAS HCO3: 27.8 MEQ/L (ref 22–26)
ARTERIAL BLOOD GAS PCO2: 29 MM HG (ref 35–45)
ARTERIAL BLOOD GAS PCO2: 32 MM HG (ref 35–45)
ARTERIAL BLOOD GAS PCO2: 34 MM HG (ref 35–45)
ARTERIAL BLOOD GAS PCO2: 36 MM HG (ref 35–45)
ARTERIAL BLOOD GAS PCO2: 38 MM HG (ref 35–45)
ARTERIAL BLOOD GAS PH: 7.46 (ref 7.35–7.45)
ARTERIAL BLOOD GAS PH: 7.48 (ref 7.35–7.45)
ARTERIAL BLOOD GAS PH: 7.5 (ref 7.35–7.45)
ARTERIAL BLOOD GAS PH: 7.52 (ref 7.35–7.45)
ARTERIAL BLOOD GAS PH: 7.53 (ref 7.35–7.45)
ARTERIAL BLOOD GAS PH: 7.53 (ref 7.35–7.45)
ARTERIAL BLOOD GAS PH: 7.54 (ref 7.35–7.45)
ARTERIAL BLOOD GAS PO2: 108 MM HG (ref 80–105)
ARTERIAL BLOOD GAS PO2: 55 MM HG (ref 80–105)
ARTERIAL BLOOD GAS PO2: 69 MM HG (ref 80–105)
ARTERIAL BLOOD GAS PO2: 74 MM HG (ref 80–105)
ARTERIAL BLOOD GAS PO2: 76 MM HG (ref 80–105)
ARTERIAL BLOOD GAS PO2: 77 MM HG (ref 80–105)
ARTERIAL BLOOD GAS PO2: 78 MM HG (ref 80–105)
AST SERPL-CCNC: 34 U/L (ref 15–37)
AST SERPL-CCNC: 46 U/L (ref 15–37)
ATRIAL RATE: 119 BPM
ATRIAL RATE: 58 BPM
BASOPHILS # BLD AUTO: 0.01 X10(3) UL (ref 0–0.2)
BASOPHILS # BLD AUTO: 0.01 X10(3) UL (ref 0–0.2)
BASOPHILS # BLD AUTO: 0.03 X10(3) UL (ref 0–0.2)
BASOPHILS NFR BLD AUTO: 0.1 %
BASOPHILS NFR BLD AUTO: 0.1 %
BASOPHILS NFR BLD AUTO: 0.5 %
BILIRUB SERPL-MCNC: 0.8 MG/DL (ref 0.1–2)
BILIRUB SERPL-MCNC: 1.5 MG/DL (ref 0.1–2)
BILIRUB UR QL STRIP.AUTO: NEGATIVE
BILIRUB UR QL STRIP.AUTO: NEGATIVE
BLOOD TYPE BARCODE: 5100
BUN BLD-MCNC: 25 MG/DL (ref 7–18)
BUN BLD-MCNC: 26 MG/DL (ref 7–18)
BUN BLD-MCNC: 33 MG/DL (ref 7–18)
BUN BLD-MCNC: 42 MG/DL (ref 7–18)
BUN BLD-MCNC: 70 MG/DL (ref 7–18)
BUN BLD-MCNC: 86 MG/DL (ref 7–18)
BUN/CREAT SERPL: 18.1 (ref 10–20)
BUN/CREAT SERPL: 20.3 (ref 10–20)
BUN/CREAT SERPL: 22.4 (ref 10–20)
BUN/CREAT SERPL: 36.8 (ref 10–20)
BUN/CREAT SERPL: 47.6 (ref 10–20)
BUN/CREAT SERPL: 52.8 (ref 10–20)
CALCIUM BLD-MCNC: 7.9 MG/DL (ref 8.5–10.1)
CALCIUM BLD-MCNC: 8.9 MG/DL (ref 8.5–10.1)
CALCIUM BLD-MCNC: 9 MG/DL (ref 8.5–10.1)
CALCIUM BLD-MCNC: 9 MG/DL (ref 8.5–10.1)
CALCIUM BLD-MCNC: 9.1 MG/DL (ref 8.5–10.1)
CALCIUM BLD-MCNC: 9.2 MG/DL (ref 8.5–10.1)
CALCULATED O2 SATURATION: 92 % (ref 92–100)
CALCULATED O2 SATURATION: 95 % (ref 92–100)
CALCULATED O2 SATURATION: 96 % (ref 92–100)
CALCULATED O2 SATURATION: 97 % (ref 92–100)
CALCULATED O2 SATURATION: 99 % (ref 92–100)
CARBOXYHEMOGLOBIN: 0.9 % SAT (ref 0–3)
CARBOXYHEMOGLOBIN: 1 % SAT (ref 0–3)
CARBOXYHEMOGLOBIN: 1 % SAT (ref 0–3)
CARBOXYHEMOGLOBIN: 1.1 % SAT (ref 0–3)
CARBOXYHEMOGLOBIN: 1.2 % SAT (ref 0–3)
CARBOXYHEMOGLOBIN: 1.3 % SAT (ref 0–3)
CARBOXYHEMOGLOBIN: 1.4 % SAT (ref 0–3)
CHLORIDE SERPL-SCNC: 108 MMOL/L (ref 98–112)
CHLORIDE SERPL-SCNC: 111 MMOL/L (ref 98–112)
CHLORIDE SERPL-SCNC: 112 MMOL/L (ref 98–112)
CHLORIDE SERPL-SCNC: 112 MMOL/L (ref 98–112)
CHOLEST SMN-MCNC: 114 MG/DL (ref ?–200)
CO2 SERPL-SCNC: 24 MMOL/L (ref 21–32)
CO2 SERPL-SCNC: 26 MMOL/L (ref 21–32)
CO2 SERPL-SCNC: 29 MMOL/L (ref 21–32)
CO2 SERPL-SCNC: 31 MMOL/L (ref 21–32)
COLOR UR AUTO: YELLOW
CREAT BLD-MCNC: 1.14 MG/DL (ref 0.55–1.02)
CREAT BLD-MCNC: 1.28 MG/DL (ref 0.55–1.02)
CREAT BLD-MCNC: 1.38 MG/DL (ref 0.55–1.02)
CREAT BLD-MCNC: 1.47 MG/DL (ref 0.55–1.02)
CREAT BLD-MCNC: 1.47 MG/DL (ref 0.55–1.02)
CREAT BLD-MCNC: 1.63 MG/DL (ref 0.55–1.02)
CRP SERPL-MCNC: 4.36 MG/DL (ref ?–0.3)
DEPRECATED RDW RBC AUTO: 54.8 FL (ref 35.1–46.3)
DEPRECATED RDW RBC AUTO: 55 FL (ref 35.1–46.3)
DEPRECATED RDW RBC AUTO: 56.4 FL (ref 35.1–46.3)
DEPRECATED RDW RBC AUTO: 56.5 FL (ref 35.1–46.3)
DEPRECATED RDW RBC AUTO: 56.8 FL (ref 35.1–46.3)
DEPRECATED RDW RBC AUTO: 57 FL (ref 35.1–46.3)
EOSINOPHIL # BLD AUTO: 0 X10(3) UL (ref 0–0.7)
EOSINOPHIL # BLD AUTO: 0.07 X10(3) UL (ref 0–0.7)
EOSINOPHIL # BLD AUTO: 0.12 X10(3) UL (ref 0–0.7)
EOSINOPHIL NFR BLD AUTO: 0 %
EOSINOPHIL NFR BLD AUTO: 0.7 %
EOSINOPHIL NFR BLD AUTO: 1.9 %
ERYTHROCYTE [DISTWIDTH] IN BLOOD BY AUTOMATED COUNT: 13.4 % (ref 11–15)
ERYTHROCYTE [DISTWIDTH] IN BLOOD BY AUTOMATED COUNT: 13.5 % (ref 11–15)
ERYTHROCYTE [DISTWIDTH] IN BLOOD BY AUTOMATED COUNT: 13.9 % (ref 11–15)
ERYTHROCYTE [DISTWIDTH] IN BLOOD BY AUTOMATED COUNT: 14 % (ref 11–15)
ERYTHROCYTE [DISTWIDTH] IN BLOOD BY AUTOMATED COUNT: 14.6 % (ref 11–15)
ERYTHROCYTE [DISTWIDTH] IN BLOOD BY AUTOMATED COUNT: 14.8 % (ref 11–15)
EXPIRATORY PRESSURE: 6 CM H2O
EXPIRATORY PRESSURE: 6 CM H2O
EXPIRATORY PRESSURE: 8 CM H2O
FIO2: 100 %
FIO2: 100 %
FIO2: 40 %
FIO2: 90 %
FIO2: 90 %
GLOBULIN PLAS-MCNC: 3.7 G/DL (ref 2.8–4.4)
GLOBULIN PLAS-MCNC: 3.7 G/DL (ref 2.8–4.4)
GLUCOSE BLD-MCNC: 116 MG/DL (ref 70–99)
GLUCOSE BLD-MCNC: 116 MG/DL (ref 70–99)
GLUCOSE BLD-MCNC: 127 MG/DL (ref 70–99)
GLUCOSE BLD-MCNC: 133 MG/DL (ref 70–99)
GLUCOSE BLD-MCNC: 135 MG/DL (ref 70–99)
GLUCOSE BLD-MCNC: 138 MG/DL (ref 70–99)
GLUCOSE BLD-MCNC: 144 MG/DL (ref 70–99)
GLUCOSE BLD-MCNC: 146 MG/DL (ref 70–99)
GLUCOSE BLD-MCNC: 148 MG/DL (ref 70–99)
GLUCOSE BLD-MCNC: 153 MG/DL (ref 70–99)
GLUCOSE BLD-MCNC: 157 MG/DL (ref 70–99)
GLUCOSE BLD-MCNC: 160 MG/DL (ref 70–99)
GLUCOSE BLD-MCNC: 161 MG/DL (ref 70–99)
GLUCOSE BLD-MCNC: 161 MG/DL (ref 70–99)
GLUCOSE BLD-MCNC: 166 MG/DL (ref 70–99)
GLUCOSE BLD-MCNC: 172 MG/DL (ref 70–99)
GLUCOSE BLD-MCNC: 174 MG/DL (ref 70–99)
GLUCOSE BLD-MCNC: 183 MG/DL (ref 70–99)
GLUCOSE BLD-MCNC: 195 MG/DL (ref 70–99)
GLUCOSE BLD-MCNC: 202 MG/DL (ref 70–99)
GLUCOSE BLD-MCNC: 229 MG/DL (ref 70–99)
GLUCOSE BLD-MCNC: 250 MG/DL (ref 70–99)
GLUCOSE UR STRIP.AUTO-MCNC: 50 MG/DL
GLUCOSE UR STRIP.AUTO-MCNC: NEGATIVE MG/DL
HAV IGM SER QL: 1.9 MG/DL (ref 1.6–2.6)
HAV IGM SER QL: 1.9 MG/DL (ref 1.6–2.6)
HAV IGM SER QL: 2.1 MG/DL (ref 1.6–2.6)
HAV IGM SER QL: 2.4 MG/DL (ref 1.6–2.6)
HCT VFR BLD AUTO: 32.6 % (ref 35–48)
HCT VFR BLD AUTO: 33.9 % (ref 35–48)
HCT VFR BLD AUTO: 34.8 % (ref 35–48)
HCT VFR BLD AUTO: 36.1 % (ref 35–48)
HCT VFR BLD AUTO: 36.9 % (ref 35–48)
HCT VFR BLD AUTO: 38.8 % (ref 35–48)
HDLC SERPL-MCNC: 58 MG/DL (ref 40–59)
HGB BLD-MCNC: 10.6 G/DL (ref 12–16)
HGB BLD-MCNC: 10.9 G/DL (ref 12–16)
HGB BLD-MCNC: 11.4 G/DL (ref 12–16)
HGB BLD-MCNC: 11.5 G/DL (ref 12–16)
HGB BLD-MCNC: 12.2 G/DL (ref 12–16)
HGB BLD-MCNC: 12.5 G/DL (ref 12–16)
IMM GRANULOCYTES # BLD AUTO: 0.02 X10(3) UL (ref 0–1)
IMM GRANULOCYTES # BLD AUTO: 0.03 X10(3) UL (ref 0–1)
IMM GRANULOCYTES # BLD AUTO: 0.04 X10(3) UL (ref 0–1)
IMM GRANULOCYTES NFR BLD: 0.3 %
IMM GRANULOCYTES NFR BLD: 0.3 %
IMM GRANULOCYTES NFR BLD: 0.5 %
INR BLD: 1.17 (ref 0.9–1.1)
INR BLD: 1.31 (ref 0.9–1.1)
INR BLD: 2.51 (ref 0.9–1.1)
INSP PRESSURE: 12 CM H2O
INSP PRESSURE: 12 CM H2O
INSP PRESSURE: 15 CM H2O
IONIZED CALCIUM: 1.04 MMOL/L (ref 1.12–1.32)
IONIZED CALCIUM: 1.21 MMOL/L (ref 1.12–1.32)
IONIZED CALCIUM: 1.22 MMOL/L (ref 1.12–1.32)
KETONES UR STRIP.AUTO-MCNC: NEGATIVE MG/DL
KETONES UR STRIP.AUTO-MCNC: NEGATIVE MG/DL
L/M: 10 L/MIN
L/M: 15 L/MIN
L/M: 40 L/MIN
LACTIC ACID ARTERIAL: 1.8 MMOL/L (ref 0.5–2)
LACTIC ACID ARTERIAL: 2.3 MMOL/L (ref 0.5–2)
LACTIC ACID ARTERIAL: <1.6 MMOL/L (ref 0.5–2)
LDLC SERPL CALC-MCNC: 38 MG/DL (ref ?–100)
LYMPHOCYTES # BLD AUTO: 0.9 X10(3) UL (ref 1–4)
LYMPHOCYTES # BLD AUTO: 0.93 X10(3) UL (ref 1–4)
LYMPHOCYTES # BLD AUTO: 2.4 X10(3) UL (ref 1–4)
LYMPHOCYTES NFR BLD AUTO: 11.6 %
LYMPHOCYTES NFR BLD AUTO: 38.3 %
LYMPHOCYTES NFR BLD AUTO: 9.2 %
M PROTEIN MFR SERPL ELPH: 6.3 G/DL (ref 6.4–8.2)
M PROTEIN MFR SERPL ELPH: 7.5 G/DL (ref 6.4–8.2)
MCH RBC QN AUTO: 35.1 PG (ref 26–34)
MCH RBC QN AUTO: 35.3 PG (ref 26–34)
MCH RBC QN AUTO: 35.5 PG (ref 26–34)
MCH RBC QN AUTO: 35.6 PG (ref 26–34)
MCHC RBC AUTO-ENTMCNC: 31.6 G/DL (ref 31–37)
MCHC RBC AUTO-ENTMCNC: 32.2 G/DL (ref 31–37)
MCHC RBC AUTO-ENTMCNC: 32.2 G/DL (ref 31–37)
MCHC RBC AUTO-ENTMCNC: 32.5 G/DL (ref 31–37)
MCHC RBC AUTO-ENTMCNC: 33 G/DL (ref 31–37)
MCHC RBC AUTO-ENTMCNC: 33.1 G/DL (ref 31–37)
MCV RBC AUTO: 107.3 FL (ref 80–100)
MCV RBC AUTO: 107.4 FL (ref 80–100)
MCV RBC AUTO: 108.7 FL (ref 80–100)
MCV RBC AUTO: 109 FL (ref 80–100)
MCV RBC AUTO: 110.4 FL (ref 80–100)
MCV RBC AUTO: 112.8 FL (ref 80–100)
METHEMOGLOBIN: 0.4 % SAT (ref 0.4–1.5)
METHEMOGLOBIN: 0.5 % SAT (ref 0.4–1.5)
MONOCYTES # BLD AUTO: 0.4 X10(3) UL (ref 0.1–1)
MONOCYTES # BLD AUTO: 0.43 X10(3) UL (ref 0.1–1)
MONOCYTES # BLD AUTO: 0.51 X10(3) UL (ref 0.1–1)
MONOCYTES NFR BLD AUTO: 4.1 %
MONOCYTES NFR BLD AUTO: 6.4 %
MONOCYTES NFR BLD AUTO: 6.9 %
NEUTROPHILS # BLD AUTO: 3.27 X10 (3) UL (ref 1.5–7.7)
NEUTROPHILS # BLD AUTO: 3.27 X10(3) UL (ref 1.5–7.7)
NEUTROPHILS # BLD AUTO: 6.54 X10 (3) UL (ref 1.5–7.7)
NEUTROPHILS # BLD AUTO: 6.54 X10(3) UL (ref 1.5–7.7)
NEUTROPHILS # BLD AUTO: 8.4 X10 (3) UL (ref 1.5–7.7)
NEUTROPHILS # BLD AUTO: 8.4 X10(3) UL (ref 1.5–7.7)
NEUTROPHILS NFR BLD AUTO: 52.1 %
NEUTROPHILS NFR BLD AUTO: 81.4 %
NEUTROPHILS NFR BLD AUTO: 85.6 %
NITRITE UR QL STRIP.AUTO: NEGATIVE
NITRITE UR QL STRIP.AUTO: NEGATIVE
NONHDLC SERPL-MCNC: 56 MG/DL (ref ?–130)
NT-PROBNP SERPL-MCNC: ABNORMAL PG/ML (ref ?–450)
NT-PROBNP SERPL-MCNC: ABNORMAL PG/ML (ref ?–450)
OSMOLALITY SERPL CALC.SUM OF ELEC: 297 MOSM/KG (ref 275–295)
OSMOLALITY SERPL CALC.SUM OF ELEC: 300 MOSM/KG (ref 275–295)
OSMOLALITY SERPL CALC.SUM OF ELEC: 307 MOSM/KG (ref 275–295)
OSMOLALITY SERPL CALC.SUM OF ELEC: 308 MOSM/KG (ref 275–295)
OSMOLALITY SERPL CALC.SUM OF ELEC: 327 MOSM/KG (ref 275–295)
OSMOLALITY SERPL CALC.SUM OF ELEC: 339 MOSM/KG (ref 275–295)
P AXIS: 81 DEGREES
P-R INTERVAL: 184 MS
P/F RATIO: 110.1 MMHG
P/F RATIO: 187.3 MMHG
P/F RATIO: 269.2 MMHG
P/F RATIO: 329.8 MMHG
P/F RATIO: 368.7 MMHG
P/F RATIO: 75.4 MMHG
P/F RATIO: 84.2 MMHG
PATIENT TEMPERATURE: 97.8 F
PATIENT TEMPERATURE: 97.9 F
PATIENT TEMPERATURE: 97.9 F
PATIENT TEMPERATURE: 98.4 F
PATIENT TEMPERATURE: 98.5 F
PATIENT TEMPERATURE: 98.6 F
PATIENT TEMPERATURE: 99.8 F
PH UR STRIP.AUTO: 5 [PH] (ref 4.5–8)
PH UR STRIP.AUTO: 6 [PH] (ref 4.5–8)
PHOSPHATE SERPL-MCNC: 2.3 MG/DL (ref 2.5–4.9)
PHOSPHATE SERPL-MCNC: 2.5 MG/DL (ref 2.5–4.9)
PHOSPHATE SERPL-MCNC: 5.1 MG/DL (ref 2.5–4.9)
PLATELET # BLD AUTO: 102 10(3)UL (ref 150–450)
PLATELET # BLD AUTO: 106 10(3)UL (ref 150–450)
PLATELET # BLD AUTO: 113 10(3)UL (ref 150–450)
PLATELET # BLD AUTO: 76 10(3)UL (ref 150–450)
PLATELET # BLD AUTO: 76 10(3)UL (ref 150–450)
PLATELET # BLD AUTO: 80 10(3)UL (ref 150–450)
POTASSIUM BLOOD GAS: 3 MMOL/L (ref 3.6–5.1)
POTASSIUM BLOOD GAS: 3.2 MMOL/L (ref 3.6–5.1)
POTASSIUM BLOOD GAS: 3.3 MMOL/L (ref 3.6–5.1)
POTASSIUM SERPL-SCNC: 3.1 MMOL/L (ref 3.5–5.1)
POTASSIUM SERPL-SCNC: 3.2 MMOL/L (ref 3.5–5.1)
POTASSIUM SERPL-SCNC: 3.4 MMOL/L (ref 3.5–5.1)
POTASSIUM SERPL-SCNC: 3.7 MMOL/L (ref 3.5–5.1)
POTASSIUM SERPL-SCNC: 3.8 MMOL/L (ref 3.5–5.1)
POTASSIUM SERPL-SCNC: 4 MMOL/L (ref 3.5–5.1)
POTASSIUM SERPL-SCNC: 4.7 MMOL/L (ref 3.5–5.1)
POTASSIUM SERPL-SCNC: 4.7 MMOL/L (ref 3.5–5.1)
PROCALCITONIN SERPL-MCNC: 0.16 NG/ML
PROT UR STRIP.AUTO-MCNC: 30 MG/DL
PROT UR STRIP.AUTO-MCNC: 30 MG/DL
PSA SERPL DL<=0.01 NG/ML-MCNC: 15.4 SECONDS (ref 12.5–14.7)
PSA SERPL DL<=0.01 NG/ML-MCNC: 16.9 SECONDS (ref 12.5–14.7)
PSA SERPL DL<=0.01 NG/ML-MCNC: 28.7 SECONDS (ref 12.5–14.7)
Q-T INTERVAL: 354 MS
Q-T INTERVAL: 438 MS
QRS DURATION: 74 MS
QRS DURATION: 84 MS
QTC CALCULATION (BEZET): 415 MS
QTC CALCULATION (BEZET): 492 MS
R AXIS: 59 DEGREES
R AXIS: 65 DEGREES
RBC # BLD AUTO: 3 X10(6)UL (ref 3.8–5.3)
RBC # BLD AUTO: 3.07 X10(6)UL (ref 3.8–5.3)
RBC # BLD AUTO: 3.2 X10(6)UL (ref 3.8–5.3)
RBC # BLD AUTO: 3.24 X10(6)UL (ref 3.8–5.3)
RBC # BLD AUTO: 3.44 X10(6)UL (ref 3.8–5.3)
RBC # BLD AUTO: 3.56 X10(6)UL (ref 3.8–5.3)
RH BLOOD TYPE: POSITIVE
SED RATE-ML: 29 MM/HR (ref 0–25)
SODIUM BLOOD GAS: 146 MMOL/L (ref 136–144)
SODIUM BLOOD GAS: 147 MMOL/L (ref 136–144)
SODIUM BLOOD GAS: 149 MMOL/L (ref 136–144)
SODIUM SERPL-SCNC: 140 MMOL/L (ref 136–145)
SODIUM SERPL-SCNC: 141 MMOL/L (ref 136–145)
SODIUM SERPL-SCNC: 142 MMOL/L (ref 136–145)
SODIUM SERPL-SCNC: 144 MMOL/L (ref 136–145)
SODIUM SERPL-SCNC: 147 MMOL/L (ref 136–145)
SODIUM SERPL-SCNC: 150 MMOL/L (ref 136–145)
SP GR UR STRIP.AUTO: 1.02 (ref 1–1.03)
SP GR UR STRIP.AUTO: 1.02 (ref 1–1.03)
T AXIS: -4 DEGREES
T AXIS: 252 DEGREES
TOTAL HEMOGLOBIN: 11 G/DL (ref 11.7–16)
TOTAL HEMOGLOBIN: 11.1 G/DL (ref 11.7–16)
TOTAL HEMOGLOBIN: 11.3 G/DL (ref 11.7–16)
TOTAL HEMOGLOBIN: 11.3 G/DL (ref 11.7–16)
TOTAL HEMOGLOBIN: 11.5 G/DL (ref 11.7–16)
TOTAL HEMOGLOBIN: 11.5 G/DL (ref 11.7–16)
TOTAL HEMOGLOBIN: 12.6 G/DL (ref 11.7–16)
TRIGL SERPL-MCNC: 88 MG/DL (ref 30–149)
TROPONIN I SERPL-MCNC: <0.045 NG/ML (ref ?–0.04)
UROBILINOGEN UR STRIP.AUTO-MCNC: 2 MG/DL
UROBILINOGEN UR STRIP.AUTO-MCNC: <2 MG/DL
VENT RATE: 12 /MIN
VENTRICULAR RATE: 116 BPM
VENTRICULAR RATE: 54 BPM
VLDLC SERPL CALC-MCNC: 18 MG/DL (ref 0–30)
WBC # BLD AUTO: 10.8 X10(3) UL (ref 4–11)
WBC # BLD AUTO: 6.3 X10(3) UL (ref 4–11)
WBC # BLD AUTO: 8 X10(3) UL (ref 4–11)
WBC # BLD AUTO: 9 X10(3) UL (ref 4–11)
WBC # BLD AUTO: 9.7 X10(3) UL (ref 4–11)
WBC # BLD AUTO: 9.8 X10(3) UL (ref 4–11)

## 2020-01-01 PROCEDURE — 95816 EEG AWAKE AND DROWSY: CPT | Performed by: OTHER

## 2020-01-01 PROCEDURE — 99291 CRITICAL CARE FIRST HOUR: CPT | Performed by: INTERNAL MEDICINE

## 2020-01-01 PROCEDURE — 71045 X-RAY EXAM CHEST 1 VIEW: CPT | Performed by: INTERNAL MEDICINE

## 2020-01-01 PROCEDURE — 71045 X-RAY EXAM CHEST 1 VIEW: CPT | Performed by: NURSE PRACTITIONER

## 2020-01-01 PROCEDURE — 93306 TTE W/DOPPLER COMPLETE: CPT | Performed by: INTERNAL MEDICINE

## 2020-01-01 PROCEDURE — 70450 CT HEAD/BRAIN W/O DYE: CPT | Performed by: NURSE PRACTITIONER

## 2020-01-01 PROCEDURE — 70496 CT ANGIOGRAPHY HEAD: CPT | Performed by: EMERGENCY MEDICINE

## 2020-01-01 PROCEDURE — 73030 X-RAY EXAM OF SHOULDER: CPT | Performed by: EMERGENCY MEDICINE

## 2020-01-01 PROCEDURE — 99223 1ST HOSP IP/OBS HIGH 75: CPT | Performed by: NEUROLOGICAL SURGERY

## 2020-01-01 PROCEDURE — 99291 CRITICAL CARE FIRST HOUR: CPT | Performed by: HOSPITALIST

## 2020-01-01 PROCEDURE — 70450 CT HEAD/BRAIN W/O DYE: CPT | Performed by: NEUROLOGICAL SURGERY

## 2020-01-01 PROCEDURE — 99233 SBSQ HOSP IP/OBS HIGH 50: CPT | Performed by: STUDENT IN AN ORGANIZED HEALTH CARE EDUCATION/TRAINING PROGRAM

## 2020-01-01 PROCEDURE — 99291 CRITICAL CARE FIRST HOUR: CPT | Performed by: NURSE PRACTITIONER

## 2020-01-01 PROCEDURE — 30233K1 TRANSFUSION OF NONAUTOLOGOUS FROZEN PLASMA INTO PERIPHERAL VEIN, PERCUTANEOUS APPROACH: ICD-10-PCS | Performed by: INTERNAL MEDICINE

## 2020-01-01 PROCEDURE — 70450 CT HEAD/BRAIN W/O DYE: CPT | Performed by: EMERGENCY MEDICINE

## 2020-01-01 PROCEDURE — 99232 SBSQ HOSP IP/OBS MODERATE 35: CPT | Performed by: PHYSICIAN ASSISTANT

## 2020-01-01 PROCEDURE — 72125 CT NECK SPINE W/O DYE: CPT | Performed by: EMERGENCY MEDICINE

## 2020-01-01 PROCEDURE — 30283B1 TRANSFUSION OF NONAUTOLOGOUS 4-FACTOR PROTHROMBIN COMPLEX CONCENTRATE INTO VEIN, PERCUTANEOUS APPROACH: ICD-10-PCS | Performed by: EMERGENCY MEDICINE

## 2020-01-01 PROCEDURE — 5A09357 ASSISTANCE WITH RESPIRATORY VENTILATION, LESS THAN 24 CONSECUTIVE HOURS, CONTINUOUS POSITIVE AIRWAY PRESSURE: ICD-10-PCS | Performed by: INTERNAL MEDICINE

## 2020-01-01 RX ORDER — ACETAMINOPHEN 10 MG/ML
1000 INJECTION, SOLUTION INTRAVENOUS EVERY 6 HOURS PRN
Status: DISCONTINUED | OUTPATIENT
Start: 2020-01-01 | End: 2020-01-01

## 2020-01-01 RX ORDER — SODIUM CHLORIDE 9 MG/ML
INJECTION, SOLUTION INTRAVENOUS ONCE
Status: COMPLETED | OUTPATIENT
Start: 2020-01-01 | End: 2020-01-01

## 2020-01-01 RX ORDER — MORPHINE SULFATE 4 MG/ML
1 INJECTION, SOLUTION INTRAMUSCULAR; INTRAVENOUS
Status: DISCONTINUED | OUTPATIENT
Start: 2020-01-01 | End: 2020-01-01

## 2020-01-01 RX ORDER — LORAZEPAM 2 MG/ML
0.5 INJECTION INTRAMUSCULAR ONCE
Status: DISCONTINUED | OUTPATIENT
Start: 2020-01-01 | End: 2020-01-01

## 2020-01-01 RX ORDER — DIGOXIN 0.25 MG/ML
250 INJECTION INTRAMUSCULAR; INTRAVENOUS EVERY 6 HOURS
Status: DISCONTINUED | OUTPATIENT
Start: 2020-01-01 | End: 2020-01-01

## 2020-01-01 RX ORDER — FUROSEMIDE 10 MG/ML
40 INJECTION INTRAMUSCULAR; INTRAVENOUS ONCE
Status: COMPLETED | OUTPATIENT
Start: 2020-01-01 | End: 2020-01-01

## 2020-01-01 RX ORDER — POTASSIUM CHLORIDE 1.5 G/1.77G
40 POWDER, FOR SOLUTION ORAL EVERY 4 HOURS
Status: DISCONTINUED | OUTPATIENT
Start: 2020-01-01 | End: 2020-01-01

## 2020-01-01 RX ORDER — ONDANSETRON 2 MG/ML
4 INJECTION INTRAMUSCULAR; INTRAVENOUS EVERY 6 HOURS PRN
Status: DISCONTINUED | OUTPATIENT
Start: 2020-01-01 | End: 2020-01-01

## 2020-01-01 RX ORDER — FUROSEMIDE 10 MG/ML
INJECTION INTRAMUSCULAR; INTRAVENOUS
Status: DISPENSED
Start: 2020-01-01 | End: 2020-01-01

## 2020-01-01 RX ORDER — IPRATROPIUM BROMIDE AND ALBUTEROL SULFATE 2.5; .5 MG/3ML; MG/3ML
3 SOLUTION RESPIRATORY (INHALATION) EVERY 4 HOURS PRN
Status: DISCONTINUED | OUTPATIENT
Start: 2020-01-01 | End: 2020-01-01

## 2020-01-01 RX ORDER — FUROSEMIDE 10 MG/ML
20 INJECTION INTRAMUSCULAR; INTRAVENOUS ONCE
Status: COMPLETED | OUTPATIENT
Start: 2020-01-01 | End: 2020-01-01

## 2020-01-01 RX ORDER — SODIUM CHLORIDE 9 MG/ML
INJECTION, SOLUTION INTRAVENOUS CONTINUOUS
Status: CANCELLED | OUTPATIENT
Start: 2020-01-01 | End: 2020-01-01

## 2020-01-01 RX ORDER — POTASSIUM CHLORIDE 1.5 G/1.77G
40 POWDER, FOR SOLUTION ORAL EVERY 4 HOURS
Status: COMPLETED | OUTPATIENT
Start: 2020-01-01 | End: 2020-01-01

## 2020-01-01 RX ORDER — ACETAMINOPHEN 650 MG/1
650 SUPPOSITORY RECTAL EVERY 4 HOURS PRN
Status: DISCONTINUED | OUTPATIENT
Start: 2020-01-01 | End: 2020-01-01

## 2020-01-01 RX ORDER — LORAZEPAM 2 MG/ML
1 INJECTION INTRAMUSCULAR EVERY 4 HOURS PRN
Status: DISCONTINUED | OUTPATIENT
Start: 2020-01-01 | End: 2020-01-01

## 2020-01-01 RX ORDER — LORAZEPAM 2 MG/ML
2 INJECTION INTRAMUSCULAR EVERY 4 HOURS PRN
Status: DISCONTINUED | OUTPATIENT
Start: 2020-01-01 | End: 2020-01-01

## 2020-01-01 RX ORDER — ACETAMINOPHEN 325 MG/1
650 TABLET ORAL EVERY 4 HOURS PRN
Status: DISCONTINUED | OUTPATIENT
Start: 2020-01-01 | End: 2020-01-01

## 2020-01-01 RX ORDER — MORPHINE SULFATE 4 MG/ML
4 INJECTION, SOLUTION INTRAMUSCULAR; INTRAVENOUS ONCE
Status: COMPLETED | OUTPATIENT
Start: 2020-01-01 | End: 2020-01-01

## 2020-01-01 RX ORDER — DEXTROSE MONOHYDRATE 50 MG/ML
INJECTION, SOLUTION INTRAVENOUS CONTINUOUS
Status: DISCONTINUED | OUTPATIENT
Start: 2020-01-01 | End: 2020-01-01

## 2020-01-01 RX ORDER — SENNOSIDES 8.6 MG
17.2 TABLET ORAL NIGHTLY
Status: DISCONTINUED | OUTPATIENT
Start: 2020-01-01 | End: 2020-01-01

## 2020-01-01 RX ORDER — ONDANSETRON 4 MG/1
4 TABLET, ORALLY DISINTEGRATING ORAL EVERY 6 HOURS PRN
Status: DISCONTINUED | OUTPATIENT
Start: 2020-01-01 | End: 2020-01-01

## 2020-01-01 RX ORDER — DEXMEDETOMIDINE HYDROCHLORIDE 4 UG/ML
INJECTION, SOLUTION INTRAVENOUS CONTINUOUS
Status: DISCONTINUED | OUTPATIENT
Start: 2020-01-01 | End: 2020-01-01

## 2020-01-01 RX ORDER — ACETAMINOPHEN 10 MG/ML
1000 INJECTION, SOLUTION INTRAVENOUS EVERY 6 HOURS PRN
Status: DISPENSED | OUTPATIENT
Start: 2020-01-01 | End: 2020-01-01

## 2020-01-01 RX ORDER — DILTIAZEM HYDROCHLORIDE 5 MG/ML
10 INJECTION INTRAVENOUS EVERY 6 HOURS PRN
Status: DISCONTINUED | OUTPATIENT
Start: 2020-01-01 | End: 2020-01-01

## 2020-01-01 RX ORDER — LORAZEPAM 2 MG/ML
1 INJECTION INTRAMUSCULAR
Status: DISCONTINUED | OUTPATIENT
Start: 2020-01-01 | End: 2020-01-01

## 2020-01-01 RX ORDER — METOCLOPRAMIDE HYDROCHLORIDE 5 MG/ML
5 INJECTION INTRAMUSCULAR; INTRAVENOUS EVERY 8 HOURS PRN
Status: DISCONTINUED | OUTPATIENT
Start: 2020-01-01 | End: 2020-01-01

## 2020-01-01 RX ORDER — DILTIAZEM HYDROCHLORIDE 5 MG/ML
10 INJECTION INTRAVENOUS ONCE
Status: COMPLETED | OUTPATIENT
Start: 2020-01-01 | End: 2020-01-01

## 2020-01-01 RX ORDER — POTASSIUM CHLORIDE 1.5 G/1.77G
40 POWDER, FOR SOLUTION ORAL ONCE
Status: COMPLETED | OUTPATIENT
Start: 2020-01-01 | End: 2020-01-01

## 2020-01-01 RX ORDER — LORAZEPAM 2 MG/ML
0.5 INJECTION INTRAMUSCULAR EVERY 4 HOURS PRN
Status: DISCONTINUED | OUTPATIENT
Start: 2020-01-01 | End: 2020-01-01

## 2020-01-01 RX ORDER — MORPHINE SULFATE 4 MG/ML
2 INJECTION, SOLUTION INTRAMUSCULAR; INTRAVENOUS EVERY 2 HOUR PRN
Status: DISCONTINUED | OUTPATIENT
Start: 2020-01-01 | End: 2020-01-01

## 2020-01-01 RX ORDER — FAMOTIDINE 10 MG/ML
20 INJECTION, SOLUTION INTRAVENOUS DAILY
Status: DISCONTINUED | OUTPATIENT
Start: 2020-01-01 | End: 2020-01-01

## 2020-01-01 RX ORDER — SODIUM CHLORIDE 9 MG/ML
INJECTION, SOLUTION INTRAVENOUS CONTINUOUS
Status: DISCONTINUED | OUTPATIENT
Start: 2020-01-01 | End: 2020-01-01

## 2020-01-01 RX ORDER — PHENYLEPHRINE HCL IN 0.9% NACL 50MG/250ML
PLASTIC BAG, INJECTION (ML) INTRAVENOUS CONTINUOUS PRN
Status: DISCONTINUED | OUTPATIENT
Start: 2020-01-01 | End: 2020-01-01

## 2020-01-01 RX ORDER — FAMOTIDINE 20 MG/1
20 TABLET ORAL DAILY
Status: DISCONTINUED | OUTPATIENT
Start: 2020-01-01 | End: 2020-01-01

## 2020-01-01 RX ORDER — ACETAMINOPHEN 650 MG/1
650 SUPPOSITORY RECTAL EVERY 6 HOURS SCHEDULED
Status: DISCONTINUED | OUTPATIENT
Start: 2020-01-01 | End: 2020-01-01

## 2020-01-01 RX ORDER — POTASSIUM CHLORIDE 14.9 MG/ML
20 INJECTION INTRAVENOUS ONCE
Status: COMPLETED | OUTPATIENT
Start: 2020-01-01 | End: 2020-01-01

## 2020-01-01 RX ORDER — SCOLOPAMINE TRANSDERMAL SYSTEM 1 MG/1
1 PATCH, EXTENDED RELEASE TRANSDERMAL
Status: DISCONTINUED | OUTPATIENT
Start: 2020-01-01 | End: 2020-01-01

## 2020-01-01 RX ORDER — MORPHINE SULFATE 4 MG/ML
1 INJECTION, SOLUTION INTRAMUSCULAR; INTRAVENOUS EVERY 2 HOUR PRN
Status: DISCONTINUED | OUTPATIENT
Start: 2020-01-01 | End: 2020-01-01

## 2020-01-01 RX ORDER — HEPARIN SODIUM 5000 [USP'U]/ML
5000 INJECTION, SOLUTION INTRAVENOUS; SUBCUTANEOUS EVERY 12 HOURS SCHEDULED
Status: DISCONTINUED | OUTPATIENT
Start: 2020-01-01 | End: 2020-01-01

## 2020-01-01 RX ORDER — CHLORHEXIDINE GLUCONATE 0.12 MG/ML
15 RINSE ORAL 2 TIMES DAILY
Status: DISCONTINUED | OUTPATIENT
Start: 2020-01-01 | End: 2020-01-01

## 2020-01-01 RX ORDER — IPRATROPIUM BROMIDE AND ALBUTEROL SULFATE 2.5; .5 MG/3ML; MG/3ML
3 SOLUTION RESPIRATORY (INHALATION)
Status: DISCONTINUED | OUTPATIENT
Start: 2020-01-01 | End: 2020-01-01

## 2020-01-01 RX ORDER — ACETAMINOPHEN 160 MG/5ML
650 SOLUTION ORAL EVERY 6 HOURS SCHEDULED
Status: DISCONTINUED | OUTPATIENT
Start: 2020-01-01 | End: 2020-01-01

## 2020-01-01 RX ORDER — ACETAMINOPHEN 160 MG/5ML
650 SOLUTION ORAL EVERY 6 HOURS PRN
Status: DISCONTINUED | OUTPATIENT
Start: 2020-01-01 | End: 2020-01-01

## 2020-01-01 RX ORDER — FUROSEMIDE 10 MG/ML
40 INJECTION INTRAMUSCULAR; INTRAVENOUS
Status: DISCONTINUED | OUTPATIENT
Start: 2020-01-01 | End: 2020-01-01

## 2020-01-01 RX ORDER — NICOTINE 21 MG/24HR
1 PATCH, TRANSDERMAL 24 HOURS TRANSDERMAL DAILY
Status: DISCONTINUED | OUTPATIENT
Start: 2020-01-01 | End: 2020-01-01

## 2020-01-01 RX ORDER — LABETALOL HYDROCHLORIDE 5 MG/ML
10 INJECTION, SOLUTION INTRAVENOUS EVERY 10 MIN PRN
Status: COMPLETED | OUTPATIENT
Start: 2020-01-01 | End: 2020-01-01

## 2020-01-20 ENCOUNTER — ANTI-COAG (OUTPATIENT)
Dept: CARDIOLOGY | Age: 80
End: 2020-01-20

## 2020-01-20 DIAGNOSIS — I48.91 ATRIAL FIBRILLATION, UNSPECIFIED TYPE (CMD): ICD-10-CM

## 2020-01-20 LAB — INR PPP: 2.88

## 2020-02-17 ENCOUNTER — ANTI-COAG (OUTPATIENT)
Dept: CARDIOLOGY | Age: 80
End: 2020-02-17

## 2020-02-17 DIAGNOSIS — I48.91 ATRIAL FIBRILLATION, UNSPECIFIED TYPE (CMD): ICD-10-CM

## 2020-02-17 LAB — INR PPP: 2.5 (ref 2–3)

## 2020-02-19 PROBLEM — S06.6X1A TRAUMATIC SUBARACHNOID HEMORRHAGE WITH LOSS OF CONSCIOUSNESS OF 30 MINUTES OR LESS (HCC): Status: ACTIVE | Noted: 2020-01-01

## 2020-02-19 PROBLEM — S06.6X1A TRAUMATIC SUBARACHNOID HEMORRHAGE WITH LOSS OF CONSCIOUSNESS OF 30 MINUTES OR LESS, INITIAL ENCOUNTER (HCC): Status: ACTIVE | Noted: 2020-01-01

## 2020-02-19 PROBLEM — S42.001A CLOSED NONDISPLACED FRACTURE OF RIGHT CLAVICLE, UNSPECIFIED PART OF CLAVICLE, INITIAL ENCOUNTER: Status: ACTIVE | Noted: 2020-01-01

## 2020-02-20 NOTE — PROGRESS NOTES
Assumed care for this patient at 0730. Patient drowsy but awakens to painful stimuli upon start of shift and then voice by end of shift. Moves all extremities to painful stimuli and intermittently following commands. Dobhoff placed and tube feeds started.

## 2020-02-20 NOTE — PROGRESS NOTES
Pharmacy Note: Renal dose adjustment for Metoclopramide (Reglan)  Megha Mclean has been prescribed Metoclopramide (Reglan) 10 mg every 8 hours as needed. Estimated Creatinine Clearance: 27 mL/min (A) (based on SCr of 1.38 mg/dL (H)).     Her calculat

## 2020-02-20 NOTE — PAYOR COMM NOTE
--------------  ADMISSION REVIEW     Payor: [id number] [place] [address] #:  [id number]  Authorization Number: [id number]    Admit date: 2/19/20  Admit time: 2304       Admitting Physician: Jolie Swanson MD  Attending Physician:  MD SERGEY Newell rate rhythm and no murmur   Abdomen: Soft and nontender. No abdominal masses.   No peritoneal signs   Extremities: no edema, normal peripheral pulses   Neuro: Alert oriented and nonfocal           ED Course     Labs Reviewed   COMP METABOLIC PANEL (14) - A clavicle.      Dictated by: Enmanuel Antunez MD on 2/19/2020 at 20:49     Approved by: Enmanuel Antunez MD on 2/19/2020 at 20:52          Ct Brain Or Head ([id number])    Result Date: 2/19/2020  CONCLUSION:  1. Subdural and predominantly subarachnoid acute hem coagulopathy. Case discussed with Dr. Carley Nguyen from neurosurgery. Case discussed with Dr. El Spicer from trauma surgery. He does not need to follow the patient as there is no trauma surgical issue to resolve at this point.     Case discussed with the [place] apparently just left. Patient had an unwitnessed fall at home. Was found on the floor confused by family. Brought to emergency room with slurred speech. Has an abrasion on her right cheek. Unable to obtain review of systems due to her confusion.   Per extremities. Extremities: No edema or cyanosis. Integument: No rashes or lesions.    Psychiatric: Confused      Diagnostic Data:      Labs:  Recent Labs   Lab 02/19/20  1858   WBC 6.3   HGB 12.5   .0*   .0*   INR 2.51*       Recent Labs   La mcg/kg/hr × 55.6 kg (Dosing Weight) Intravenous Laverna Patch, RN      dilTIAZem HCl (CARDIZEM) injection 10 mg     Date Action Dose Route User    2/19/2020 2117 Given 10 mg Intravenous Sophia Console, RN      famoTIDine (PEPCID) injection 20 mg     Kai Date Action Dose Route User    2/20/2020 0500 New Bag (none) Intravenous Miguel Proctor, DELFINA      sodium chloride 0.9% IV bolus 500 mL     Date Action Dose Route User    2/19/2020 2117 New Bag (none) Intravenous Jeffrey Cardenas RN      2/20  Reason for hold oac, check eeg  Cardiac:  HTN/Afib- sbp goal 100-150, hold oac 2/2 above  Pulmonary:  Stable on RA  Renal:  IVFs, monitor BUN/Cr  GI:  Place DHT  Heme/ID:  Afebrile, no leukocytosis, s/p k centra for reversal of oac, cont vit K  Endocrine:  Monitor gl

## 2020-02-20 NOTE — SLP NOTE
Attempted to see pt for speech therapy services- pt not appropriate given lethargy per RN report. Will follow up as scheduling permits. Thank you.     Dominic Nina M.S. [address] [facility] [place]  Pager [phone]

## 2020-02-20 NOTE — CONSULTS
[facility]  Neurocritical Care Consult Note    Gio Soriano Patient Status:  Inpatient    1940 MRN [id number]   [facility] 6NE-A Attending Angel Uriarte MD   Hosp Day # 1 PCP ELLIE Luna       Reason for Rfl: 11, Taking  WARFARIN SODIUM 5 MG Oral Tab, TAKE 1 - 1 1/2 TABLETS EVERY EVENING, AS DIRECTED BY COUMADIN CLINIC (Patient taking differently: 7.5 mg 1 day a week. ...5 mg six days a week), Disp: 50 tablet, Rfl: 2, Taking      No Known Allergies  Social Or  acetaminophen (TYLENOL) 650 MG rectal suppository 650 mg, 650 mg, Rectal, Q4H PRN  Labetalol HCl (TRANDATE) injection 10 mg, 10 mg, Intravenous, Q10 Min PRN  niCARdipine HCl in NaCl (CARDENE) 20 mg/200 ml premix infusion, 5-15 mg/hr, Intravenous, [facility] touch    Diagnostics:   Xr Shoulder, Complete (min 2 Views), Right (cpt=73030)    Result Date: 2/19/2020  CONCLUSION:  Mildly displaced fracture through the lateral right clavicle.      Dictated by: Tiffany Aschoff, MD on 2/19/2020 at 20:49     Approved by expected region of the stomach. If there is persistent concern regarding positioning then clinical correlation or CT would be recommended.     Dictated by: Winsome Rhoades MD on 2/20/2020 at 13:24     Approved by: Winsome Rhoades MD on 2/20/2020 at 13:24          Ct billable procedures) was administered to manage and/or prevent neurologic instability. This involved direct patient intervention, complex decision making, and/or extensive discussions with the patient, family, and clinical staff.     Thank you for allowing

## 2020-02-20 NOTE — PROCEDURES
Date of Procedure: 2/20/2020    Procedure: EEG (ELECTROENCEPHALOGRAM)     DX: UNRESPONSIVE, TRAUMATIC SAH  HX: T IS A 78 YO FEMALE WHO PRESENTED TO THE ED ON 2/19/2020 AFTER A FALL AT HOME. HX TAKEN FROM CHART.  PT WAS FOUND ON THE FLOOR CONFUSED AND EMS [facility]

## 2020-02-20 NOTE — ED PROVIDER NOTES
Patient Seen in: [facility] Emergency Department      History   Patient presents with:  Trauma 1 & 2    Stated Complaint:     HPI    42-year-old woman with a history of atrial fibrillation (on Coumadin) and anxiety [facility] 02/19/20 1852 93 %   O2 Device 02/19/20 1838 None (Room air)       Current:BP (!) 118/102   Pulse 101   Temp 96.9 °F (36.1 °C) (Temporal)   Resp 18   Ht 157.5 cm (5' 2\")   Wt 56.2 kg   SpO2 94%   Breastfeeding No   BMI 22.66 kg/m²         Physical Exam DIFFERENTIAL[[id number]]          Abnormal            Final result                 Please view results for these tests on the individual orders.    RAINBOW DRAW BLUE   RAINBOW DRAW LAVENDER   RAINBOW DRAW LIGHT GREEN   RAINBOW DRAW GOLD     EKG    Rate, inte saccular intracranial aneurysm is seen. There is a probable infundibulum measuring 1 mm at the distal right A2 branch of the anterior cerebral artery.   The etiology of this subarachnoid hemorrhage could be further assessed with cerebral catheter angiograp Traumatic subarachnoid hemorrhage with loss of consciousness of 30 minutes or less ([facility]) S06. 6X1A 2/19/2020 Unknown

## 2020-02-20 NOTE — CONSULTS
[facility]  Neurosurgery Consult    Angelica Ingrid Patient Status:  Inpatient    1940 MRN [id number]   [facility] 6NE-A Attending Patricia Perea MD   Hosp Day # 1 PCP ELLIE Bryan     REASON FOR CONSULTATION:  Acute SDH  Tr Cancer in her mother and sister; Depression in her son and son; Hypertension in her son; Other in her brother, brother, sister, sister, and son. SOCIAL HISTORY:   reports that she has been smoking cigarettes. She has a 25.00 pack-year smoking history.  Aaron Sadler 17.2 mg, 17.2 mg, Oral, Nightly  ondansetron HCl (ZOFRAN) injection 4 mg, 4 mg, Intravenous, Q6H PRN    Or  Metoclopramide HCl (REGLAN) injection 5 mg, 5 mg, Intravenous, Q8H PRN  famoTIDine (PEPCID) tab 20 mg, 20 mg, Oral, Daily    Or  famoTIDine (PEPCID) right MCA bifurcation. INTRACRANIAL:  Scattered subdural hemorrhage. There is no midline shift. There is no definite acute intracranial hemorrhage. SINUSES:           No sign of acute sinusitis. MASTOIDS:          No sign of acute inflammation. right lateral ventricle. There   is a small amount of subdural hemorrhage along the right tentorium.        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 CT brain 2/20     FINDINGS:       There has been interval progression of areas of subarachnoid hemorrhage, most notably within the right sylvian fissure and within right occipital horn.   Scattered areas of subarachnoid hemo morning and late afternoon. Stable neurologically, continue current management. Repeat CT in AM. Discussed with patient's son, will likely have a protracted course given the intracranial findings and her age.   He would consider surgical management if her

## 2020-02-20 NOTE — OCCUPATIONAL THERAPY NOTE
OT orders received and pt chart reviewed. Pt is on 24hr bedrest starting at time of admission 6:49pm 2/19/20. Will hold OT until bedrest is cleared and new activity orders placed.

## 2020-02-20 NOTE — CM/SW NOTE
MSW met with the patient and 4 children at bedside, one of whom is her daughter Dixon West who works in the wound care center. Patient was not a participant as she was sleeping. All children were tearful upon MSW entry. MSW offered support.   Patient was admi

## 2020-02-20 NOTE — DIETARY NOTE
[facility]    NUTRITION ASSESSMENT    Pt does not meet malnutrition criteria. NUTRITION DIAGNOSIS/PROBLEM:    Increased nutrient needs related to increased demands of healing as evidenced by TBI    NUTRITION INTERVENTION:       1.  Enteral Nutrition exam.    NUTRITION PRESCRIPTION:  Calories: 5433-5296 calories/day (20-35 calories per kg)  Protein:  grams protein/day (1.5-2.5 grams protein per kg)  Fluid: ~1 ml/kcal or per MD discretion    MONITOR AND EVALUATE/NUTRITION GOALS:    3. No signs of

## 2020-02-20 NOTE — PLAN OF CARE
Received from ED approx 2345, s/p SAH and SDH post fall. Pt is able to follow some simple commands but not all, most speech is incomprehensible, unable to state name or answer simple questions.   Sqeezed bilat hands to commands, Rt weaker than Lt but arm i

## 2020-02-20 NOTE — ED INITIAL ASSESSMENT (HPI)
PT from home, had unwitnessed fall.  found PT on floor. PT presents with slurred speech, A&Ox1, normally A&Ox2-3. Also has abrasion on R cheek.

## 2020-02-20 NOTE — H&P
[facility]  History and Physical     Eino Speed Patient Status:  Inpatient    1940 MRN [id number]   [facility] 6NE-A Attending Ning Meza MD   Hosp Day # 1 PCP ELLIE Anderson     Chief Complaint: fall, AMS    His Cancer Mother         ovarian ca   • Depression Son    • Other (Other) Son         ETOH problem , depression   • Other (Other) Sister         accident   • Other (Other) Brother         ETOH, depression   • Other (Other) Sister         kidney problem - on d Regular rate and rhythm. No murmurs, rubs or gallops. Equal pulses. Chest and Back: No tenderness or deformity. Abdomen: Soft, nontender, nondistended. Positive bowel sounds. No rebound, guarding or organomegaly.   Neurologic: No focal neurological defi current state of illness, I anticipate that, after discharge, patient will require TBD.

## 2020-02-20 NOTE — PROGRESS NOTES
[facility]  Progress Note     Eino Speed Patient Status:  Inpatient    1940 MRN [id number]   [facility] 6NE-A Attending Inocente Nguyen MD   Hosp Day # 1 PCP ELLIE Anderson     Chief Complaint: Fall     S: Patient not r TROP <0.045            Imaging: Imaging data reviewed in Epic.     Medications:   • levETIRAcetam  500 mg Intravenous Q12H   • phytonadione (VITAMIN K) IVPB  10 mg Intravenous Q24H   • LORazepam  0.5 mg Intravenous Once   • Senna  17.2 mg Oral Nightly

## 2020-02-20 NOTE — PROGRESS NOTES
[facility]  Neurocritical Care APRN Progress Note    NAME: Tanvir Hernandez - ROOM: 5400/1328-R - MRN: [id number] - Age: 70year old - OPD:3/35/9221    History Of Present Illness:  Tanvir Hernandez is a 70year old female with PMHx signi Relation Age of Onset   • Cancer Mother         ovarian ca   • Depression Son    • Other (Other) Son         ETOH problem , depression   • Other (Other) Sister         accident   • Other (Other) Brother         ETOH, depression   • Other (Other) Sister lateral right clavicle.      Dictated by: Linda Higuera MD on 2/19/2020 at 20:49     Approved by: Linda Higuera MD on 2/19/2020 at 20:52          Ct Brain Or Head (00711)    Result Date: 2/19/2020  CONCLUSION:  1. Subdural and predominantly subarach 38.8 02/19/2020    .0 02/19/2020    CREATSERUM 1.38 02/19/2020    BUN 25 02/19/2020     02/19/2020    K 4.0 02/19/2020     02/19/2020    CO2 26.0 02/19/2020     02/19/2020    CA 9.0 02/19/2020    ALB 3.8 02/19/2020    ALKPHO 168 0

## 2020-02-21 NOTE — PHYSICAL THERAPY NOTE
PHYSICAL THERAPY EVALUATION - INPATIENT     Room Number: 5915/9295-T  Evaluation Date: 2/21/2020  Type of Evaluation: Initial  Physician Order: PT Eval and Treat    Presenting Problem: Traumatic SDH/SAH  Reason for Therapy: Mobility Dysfunction and Disch impairment     glasses       Past Surgical History  Past Surgical History:   Procedure Laterality Date   • CATARACT     • CHOLECYSTECTOMY  1973   • CHOLECYSTECTOMY     • EYE CATARACT WITH INTRAOCULAR LENS Right 10/25/2018    Performed by Pamela Davis MD WFL - Within Functional Limits   L Elbow Flexion/Extension: WFL - Within Functional Limits   L Wrist Flexion: WFL - Within Functional Limits   L Wrist Extension: WFL - Within Functional Limits    LUE Strength     RLE ASSESSMENT   RLE AROM  No active moveme Impairment: 100%   Standardized Score (AM-PAC Scale): 23.55   CMS Modifier (G-Code): CN    FUNCTIONAL ABILITY STATUS  Gait Assessment   Gait Assistance: Not tested(unable to complete at this time)                   Skilled Therapy Provided: The pt was appr to assist patient in returning to prior to level of function. Based on this evaluation, patient's clinical presentation is evolving and overall the evaluation complexity is considered moderate.     DISCHARGE RECOMMENDATIONS  PT Discharge Recommendations: A

## 2020-02-21 NOTE — OCCUPATIONAL THERAPY NOTE
OCCUPATIONAL THERAPY EVALUATION - INPATIENT     Room Number: 5660/0087-W  Evaluation Date: 2/21/2020  Type of Evaluation: Initial  Presenting Problem: traumatic SAH    Physician Order: IP Consult to Occupational Therapy  Reason for Therapy: ADL/IADL Dysfun CHOLECYSTECTOMY  1973   • CHOLECYSTECTOMY     • EYE CATARACT WITH INTRAOCULAR LENS Right 10/25/2018    Performed by Costa Blood MD at [facility] MAIN OR   • [place] Left 10/11/2018    Performed by Costa Blood MD at [facility] MAIN OR   • made, assistance required to generate solutions and assistance required to implement solutions  impaired processing, flexible thinking, and executive functioning    VISION  Tracking:  minimall tracking of stimuli during session    PERCEPTION  Overall Perce EOB with 2 person assist.  Total assist required initially, but this improved to CGA following time and repositioning. Attempted func'l reaching, but pt not able to follow or initiate for this activity. Following 15 mins EOB pt returned to supine.   Intr Complexity  Occupational Profile/Medical History HIGH - Extensive review of history including review of medical or therapy records    Specific performance deficits impacting engagement in ADL/IADL HIGH  5+ performance deficits HIGH  5+ performance deficits

## 2020-02-21 NOTE — PROGRESS NOTES
[facility]  Neurosurgery Progress Note    Evelin Fallen Patient Status:  Inpatient    1940 MRN [id number]   [facility] 6NE-A Attending Nathalia Marley MD   [facility] Day # 2 PCP ELLIE Zamudio     Chief Complaint:  Fall    Subject extensive low-attenuation   periventricular white matter which can be seen with small vessel ischemic change in this is unchanged since previous study.      Along the posterior inter cerebral fissure there is 7 mm thickness of subdural hemorrhage which is u

## 2020-02-21 NOTE — PAYOR COMM NOTE
--------------  CONTINUED STAY REVIEW    Payor: [address] #:  [id number]  Authorization Number: [id number]    Admit date: 2/19/20  Admit time: 2304    Admitting Physician: Fletcher Gardner MD  Attending Physician:  Karma Mcgill MD 100-200 mcg/min, Intravenous, Continuous PRN  Senna (SENOKOT) tab 17.2 mg, 17.2 mg, Oral, Nightly  ondansetron HCl (ZOFRAN) injection 4 mg, 4 mg, Intravenous, Q6H PRN    Or  Metoclopramide HCl (REGLAN) injection 5 mg, 5 mg, Intravenous, Q8H PRN  LORazepam .0* 76.0* 80.0*      Assesment/Plan:      Neuro:  Traumatic sdh/sah- likely 2/2 mechanical fall, s/p oac reversal, cont neurochecks/pt/ot, NS on consult- rpt HCT stable, cont keppra for seizure prophylaxis, hold oac  Cardiac:  HTN/Afib- sbp goal 1 DELFINA Khan    2/21/2020 0251 Patch Applied 1 patch Transdermal (Left Upper Arm) Sade Gilbert, DELFINA      phytonadione (AQUA-MEPHYTON) 10 mg in sodium chloride 0.9% 50 mL IVPB     Date Action Dose Route User    2/21/2020 1119 New Bag 10 mg Intravenous Her

## 2020-02-21 NOTE — PROGRESS NOTES
[facility]  Neurocritical Care Progress Note     Clara Espinosa Patient Status:  Inpatient    1940 MRN [id number]   [facility] 6NE-A Attending Robert Shah MD   Hosp Day # 2 PCP ELLIE Knutson     Subjective: 17.2 mg, Oral, Nightly  ondansetron HCl (ZOFRAN) injection 4 mg, 4 mg, Intravenous, Q6H PRN    Or  Metoclopramide HCl (REGLAN) injection 5 mg, 5 mg, Intravenous, Q8H PRN  LORazepam (ATIVAN) injection 1 mg, 1 mg, Intravenous, Q15 Min PRN      Physical Exami fall, s/p oac reversal, cont neurochecks/pt/ot, NS on consult- rpt HCT stable, cont keppra for seizure prophylaxis, hold oac  Cardiac:  HTN/Afib- sbp goal 100-150, hold oac 2/2 above  Pulmonary:  Resp distress/hypoxia overnight- cxr c/w pulm edema, s/p las

## 2020-02-21 NOTE — PLAN OF CARE
Assumed care approx 1930.  0100 increased 02 sats 87%, increased 02 to 6L/NC and NT suctioned moderate amount thick secretions, 0130 desaturating to 86%, crackles bilaterally, APN notified and orders received.   Lasix administered and CXR ordered and IVF di

## 2020-02-21 NOTE — PROGRESS NOTES
[facility]  Progress Note     Kristin Andino Patient Status:  Inpatient    1940 MRN [id number]   [facility] 6NE-A Attending Yoshi Harris MD   Hosp Day # 2 PCP ELLIE Drake     Chief Complaint: Fall     S: Patient opens Labs   Lab 02/19/20  1858 02/20/20  0341 02/21/20  0417   PTP 28.7* 16.9* 15.4*   INR 2.51* 1.31* 1.17*       Recent Labs   Lab 02/19/20 1858   TROP <0.045            Imaging: Imaging data reviewed in Epic.     Medications:   • nicotine  1 patch Bib Solders

## 2020-02-21 NOTE — SLP NOTE
Orders received for bedside swallow evaluation. Attempted to see pt for BSSE, however pt not appropriate given lethargy per RN report. Will follow up as scheduling permits. Thank you. Nuvia Parish M.S., CCC-SLP/L  Speech-Language Pathologist

## 2020-02-21 NOTE — PLAN OF CARE
Assumed care of patient at 0700. Patient very lethargic. Intermittently opens  eyes to voice. Pupils equal and reactive. Does not track. Unable to follow commands. Mostly non-verbal. Able to voice \"ow\" with painful stimuli.  Extremoties spontaneously move devices  Outcome: Not Progressing     Problem: RESPIRATORY - ADULT  Goal: Achieves optimal ventilation and oxygenation  Description  INTERVENTIONS:  - Assess for changes in respiratory status  - Assess for changes in mentation and behavior  - Position to f

## 2020-02-22 NOTE — PLAN OF CARE
Pt required extra suctioning, orally and NT suctioning around 1320, nasal trumpet placed, suctioned a lot of thick copious secretions. Lung sounds very coarse, worse on left and very diminished.  Oxygen demands increased, first to 10-15L high flow, then janes

## 2020-02-22 NOTE — PLAN OF CARE
Patient VSS. SBP within limits. Neuro remain unchanged with only the ability to withdrawal from pain. On Vapotherm with improved saturations. Secretions managed through nasal trumpet for deep suctioning.    Tube feeds with no residual and at goal.  Plan

## 2020-02-22 NOTE — PROGRESS NOTES
[facility]  Neurocritical Care Progress Note     Gio Lemariam Patient Status:  Inpatient    1940 MRN [id number]   [facility] 6NE-A Attending Angel Uriarte MD   Hosp Day # 3 PCP Lizzy Hernandez MD     Subjective: Nightly  ondansetron HCl (ZOFRAN) injection 4 mg, 4 mg, Intravenous, Q6H PRN    Or  Metoclopramide HCl (REGLAN) injection 5 mg, 5 mg, Intravenous, Q8H PRN  LORazepam (ATIVAN) injection 1 mg, 1 mg, Intravenous, Q15 Min PRN      Physical Examination:   Gener insulin prn  DVT Prophylaxis:  SCD’s    Goals of the Day: neurochecks  A total of 40 minutes of critical care time (exclusive of billable procedures) was administered to manage and/or prevent neurologic instability.  This involved direct patient interventio

## 2020-02-22 NOTE — CONSULTS
[facility]  Report of Consultation    Gio Soriano Patient Status:  Inpatient    1940 MRN [id number]   [facility] 6NE-A Attending Stormy Lara MD   Hosp Day # 3 PCP Lizzy Hernandez MD     Reason for Consultation:  Hypoxemic Other (Other) Brother         epilepsy   • Hypertension Son    • Depression Son       reports that she has been smoking cigarettes. She has a 25.00 pack-year smoking history.  She has never used smokeless tobacco. She reports that she does not drink alcohol snoring, sore throat, hoarseness and voice change. Respiratory: Negative for cough, sputum, hemoptysis, chest pain, wheezing, dyspnea on exertion, or stridor.   Cardiovascular: Negative for chest pain, palpitations, irregular heart beats, syncope, fatigue, 02/21/20 2300 140/77 — — 85 23 97 % —   02/21/20 2200 130/61 — — 81 22 98 % —   02/21/20 2100 139/71 — — 82 (!) 30 95 % —   02/21/20 2045 138/69 — — 84 (!) 32 94 % —   02/21/20 2000 150/83 98.8 °F (37.1 °C) Temporal 85 (!) 30 94 % —   02/21/20 1900 146/7 02/21/20  0417   MG 1.9 1.9       Lab Results   Component Value Date    PHOS 2.5 02/21/2020        Recent Labs   Lab 02/19/20  1858 02/20/20  0341 02/21/20 0417   INR 2.51* 1.31* 1.17*   PTT 37.0*  --   --        No results for input(s): ABGPHT, [id number],

## 2020-02-22 NOTE — PROGRESS NOTES
[facility]  Neurosurgery Progress Note    Evelinerika Teixeira Patient Status:  Inpatient    1940 MRN [id number]   [facility] 6NE-A Attending Raimundo Villegas MD   [facility] Day # 3 PCP Jayshree Roman MD     Chief Complaint:  [place] Handing preserved basal cisterns. Continue current management.      Rayray Gorman MD  Neurological Surgeon  [facility]  [address], [address], [address]

## 2020-02-22 NOTE — PROGRESS NOTES
Assumed care for this patient at 0730. Patient lethargic but awakens to painful stimuli. Patient does not follow commands. CT head done. Neuro checks q2 days, q3 nights. IV fluids stopped, FWF increased from 50cc to 250cc q6hrs. Turn q2 with HOB 30degrees.

## 2020-02-22 NOTE — SLP NOTE
Attempted to see pt for speech therapy services- pt lethargic and not appropriate for evaluation per RN. Will continue to follow. Thank you.     Iman Tran M.S. [address] [facility]  Pager [phone]

## 2020-02-23 NOTE — PLAN OF CARE
Assumed care of the pt at 0730, lying in bed, said good morning to me when I approached the pt. Moves head to track, squeezed hands but cant pick arms or legs off bed. Bipap removed per pulm and placed on high flow NC.  Asked resp to give PRN treatment, sabrina

## 2020-02-23 NOTE — SLP NOTE
Attempted to see pt for BSSE. Pt continues to be lethargic and not appropriate to participate in evaluation per nurse. ST will continue to follow pt as schedule permits. Nurse aware.

## 2020-02-23 NOTE — PROGRESS NOTES
02/23/20 0337   BiPAP   $ RT Standby Charge (per 15 min) 1   Device V60   BiPAP / CPAP CE# v247   Mode Spontaneous/Timed   Interface Full face mask   Mask Size Medium   Control Settings   Set Rate 12 breaths/min   Set IPAP 12   Set EPAP 6   Oxygen Perce

## 2020-02-23 NOTE — PROGRESS NOTES
[facility]  Progress Note    Blair Aquino Patient Status:  Inpatient    1940 MRN [id number]   [facility] 6NE-A Attending Idalia Soto MD   [facility] Day # 4 PCP Jamil Higginbotham MD     Subjective:  Blair Aquino is a(n) 70 year o 40* 34* 46*  --    CA 9.0 8.9 9.0 9.2  --    ALB 3.8  --   --   --   --     141 144 142  --    K 4.0 4.7 3.7 3.8 3.4*    111 112 111  --    CO2 26.0 24.0 24.0 24.0  --    ALKPHO 168*  --   --   --   --    AST 34  --   --   --   --    ALT 27  --

## 2020-02-23 NOTE — PROGRESS NOTES
[facility]  Neurocritical Care Progress Note     Kristin Andino Patient Status:  Inpatient    1940 MRN [id number]   [facility] 6NE-A Attending Yoshi Harris MD   Hosp Day # 4 PCP Janny Kimbrough MD     Subjective: premix infusion, 5-15 mg/hr, Intravenous, Continuous PRN  phenylephrine in NaCl (STEFANY-SYNEPHRINE) 50 mg/250 ml premix infusion SOLN, 100-200 mcg/min, Intravenous, Continuous PRN  Senna (SENOKOT) tab 17.2 mg, 17.2 mg, Oral, Nightly  ondansetron HCl (ZOFRAN) oac  Cardiac:  HTN/Afib- sbp goal 100-150, hold oac 2/2 above  Pulmonary:  Resp distress/hypoxia- cxr c/w pulm edema, lasix and abx per pulm  Renal:  IVFs, monitor BUN/Cr  GI:  Cont TFs  Heme/ID:  Afebrile, no leukocytosis, s/p k centra for reversal of oac

## 2020-02-23 NOTE — PROGRESS NOTES
[facility]  Progress Note    Dawson Torrez Patient Status:  Inpatient    1940 MRN [id number]   [facility] 6NE-A Attending Clarence Watson MD   Hosp Day # 4 PCP Myron Myers MD     70year old female  With AF on coumadin s/p fal mg, Rectal, Q4H PRN  niCARdipine HCl in NaCl (CARDENE) 20 mg/200 ml premix infusion, 5-15 mg/hr, Intravenous, Continuous PRN  phenylephrine in NaCl (STEFANY-SYNEPHRINE) 50 mg/250 ml premix infusion SOLN, 100-200 mcg/min, Intravenous, Continuous PRN  Senna (SEN fibrillation (HCC)     Hyperlipidemia, mixed     Elevated MCV     Essential hypertension     Chronic atrial fibrillation (HCC)     Cortical age-related cataract of both eyes     Traumatic subarachnoid hemorrhage with loss of consciousness of 30 minutes or

## 2020-02-23 NOTE — PLAN OF CARE
Patient is lethargic, not following commands, ocassionally opens her eyes to touch, patient is tachypneic. BIPAP changed to 12/6 after 8PM ABG, patient tolerating current BIPAP settings. Plan of care explained to family, all questions answered.   Tube feeding

## 2020-02-23 NOTE — PROGRESS NOTES
[facility]  Progress Note    Ballard Cowden Patient Status:  Inpatient    1940 MRN [id number]   [facility] 6NE-A Attending Madi Rodriguez MD   Hosp Day # 3 PCP Nichola Boast, MD     70year old female  With AF on coumadin s/p fal PRN  phenylephrine in NaCl (STEFANY-SYNEPHRINE) 50 mg/250 ml premix infusion SOLN, 100-200 mcg/min, Intravenous, Continuous PRN  Senna (SENOKOT) tab 17.2 mg, 17.2 mg, Oral, Nightly  ondansetron HCl (ZOFRAN) injection 4 mg, 4 mg, Intravenous, Q6H PRN    Or  Met both eyes     Traumatic subarachnoid hemorrhage with loss of consciousness of 30 minutes or less (HCC)     Traumatic subarachnoid hemorrhage with loss of consciousness of 30 minutes or less, initial encounter ([facility] [address]     Closed nondisplaced fracture of right

## 2020-02-23 NOTE — PROGRESS NOTES
[facility]  Neurosurgery Progress Note    Samuel Bartholomew Patient Status:  Inpatient    1940 MRN [id number]   [facility] 6NE-A Attending Palmira Johnson MD   Hosp Day # 4 PCP Remi Fair MD     Chief Complaint:  AMS  Fall    Sub the right lateral ventricle is stable. Acute on chronic bilateral subdural hygromas are stable. No significant change since prior examination.     Assessment:  70year old female s/p fall with traumatic SAH w/ intraventricular extension, right front

## 2020-02-24 ENCOUNTER — ANTI-COAG (OUTPATIENT)
Dept: CARDIOLOGY | Age: 80
End: 2020-02-24

## 2020-02-24 DIAGNOSIS — I48.91 ATRIAL FIBRILLATION, UNSPECIFIED TYPE (CMD): ICD-10-CM

## 2020-02-24 NOTE — SLP NOTE
ADULT SWALLOWING EVALUATION    ASSESSMENT    ASSESSMENT/OVERALL IMPRESSION:  Pt seen this AM at bedside to assess swallow function per protocol. Pt seen lethargic but cooperative. Pts family present at bedside.  Pt admitted after son reportedly found pt on hemorrhage with loss of consciousness of 30 minutes or less (HCC)    Closed nondisplaced fracture of right clavicle, unspecified part of clavicle, initial encounter    Thrombocytopenia (HCC)    Coagulopathy (HCC)      Past Medical History  Past Medical His Swallow: No complaints consistent with possible esophageal involvement              GOALS  Goal #1 Reassess swallow function New Goal     FOLLOW UP  Treatment Plan/Recommendations: SLP to reassess  Number of Visits to Meet Established Goals: 2  Follow Up N

## 2020-02-24 NOTE — PROGRESS NOTES
[facility]  Neurosurgery Progress Note    Dulce Santana Patient Status:  Inpatient    1940 MRN [id number]   [facility] 6NE-A Attending Yariel Trotter MD   [facility] Day # 5 PCP Swetha Jean-Baptiste MD     Chief Complaint:  AMS  Fall     Loja right frontal IPH  Falcine, right parietal, and left SDHs     Plan:  Discussed with Dr. Cm Palomo on rounds  No acute surgical intervention  Hold Coumadin  Pt has poor prognosis overall with expected prolonged recovery if she recovers  Dr. Cm Palomo to follow

## 2020-02-24 NOTE — PROGRESS NOTES
[facility]  Neurocritical Care Progress Note     Tanvir Hernandez Patient Status:  Inpatient    1940 MRN [id number]   [facility] 6NE-A Attending Donna Montague MD   Hosp Day # 5 PCP Taniya Chambers MD     Subjective: 650 mg, 650 mg, Rectal, Q4H PRN  niCARdipine HCl in NaCl (CARDENE) 20 mg/200 ml premix infusion, 5-15 mg/hr, Intravenous, Continuous PRN  phenylephrine in NaCl (STEFANY-SYNEPHRINE) 50 mg/250 ml premix infusion SOLN, 100-200 mcg/min, Intravenous, Continuous PRN pulm edema, lasix and abx per pulm  Renal:  IVFs, monitor BUN/Cr  GI:  Cont TFs  Heme/ID:  Afebrile, no leukocytosis, s/p k centra for reversal of oac.  Cont empiric abx per pulm  Endocrine:  Monitor glucose, sliding scale insulin prn  DVT Prophylaxis:  SCD

## 2020-02-24 NOTE — PAYOR COMM NOTE
--------------  CONTINUED STAY REVIEW    Payor: [address] #:  [id number]  Authorization Number: [id number]    Admit date: 2/19/20  Admit time: 2304    Admitting Physician: John Pyle MD  Attending Physician:  Mandi Navarro MD Oral, Q4H PRN    Or  acetaminophen (TYLENOL) 650 MG rectal suppository 650 mg, 650 mg, Rectal, Q4H PRN  niCARdipine HCl in NaCl (CARDENE) 20 mg/200 ml premix infusion, 5-15 mg/hr, Intravenous, Continuous PRN  phenylephrine in NaCl (STEFANY-SYNEPHRINE) 50 mg/25 today  Cardiac:  HTN/Afib- sbp goal 100-150, hold oac 2/2 above  Pulmonary:  Resp distress/hypoxia- cxr c/w pulm edema, lasix and abx per pulm  Renal:  IVFs, monitor BUN/Cr  GI:  Cont TFs  Heme/ID:  Afebrile, no leukocytosis, s/p k centra for reversal of o 02/24/2020      02/24/2020     K 3.1 02/24/2020      02/24/2020     CO2 29.0 02/24/2020      02/24/2020     CA 9.1 02/24/2020     ESRML 29 02/24/2020     CRP 4.36 02/24/2020     MG 2.1 02/24/2020     PHOS 2.3 02/24/2020     PGLU 172 02/2

## 2020-02-24 NOTE — PROGRESS NOTES
[facility]  Progress Note    Mark Nicholson Patient Status:  Inpatient    1940 MRN [id number]   [facility] 6NE-A Attending Mirtha Lindquist MD   [facility] Day # 5 PCP Rafal Mckeon MD     Subjective:  Mark Nicholson is a(n) 70 year o interactive, confused, no obvious/reported focal deficits    Lab Data Review:  Recent Labs   Lab 02/19/20  1858  02/21/20  0417 02/22/20  0415 02/24/20  0438   RBC 3.56*   < > 3.07* 3.00* 3.44*   HGB 12.5   < > 10.9* 10.6* 12.2   HCT 38.8   < > 33.9* 32.6* worse.    Cultures: No new/positive culture results    Radiology: Chest x-ray without significant change-left lung infiltrate, effusion noted    Medications reviewed.       Assessment:  · Hypoxemic respiratory failure- volume overload/diastolic dysfunction

## 2020-02-24 NOTE — PLAN OF CARE
Problem: Impaired Swallowing  Goal: Minimize aspiration risk  Description  Interventions:  -NPO  -Oral care   Outcome: Progressing

## 2020-02-24 NOTE — PROGRESS NOTES
[facility]  Progress Note    Patibonny Yates Patient Status:  Inpatient    1940 MRN [id number]   [facility] 6NE-A Attending Mayra Juarez MD   Hosp Day # 5 PCP Jeannie Winters MD     70year old female  With AF on coumadin s/p fal mg, 650 mg, Oral, Q4H PRN    Or  acetaminophen (TYLENOL) 650 MG rectal suppository 650 mg, 650 mg, Rectal, Q4H PRN  niCARdipine HCl in NaCl (CARDENE) 20 mg/200 ml premix infusion, 5-15 mg/hr, Intravenous, Continuous PRN  phenylephrine in NaCl (STEFANY-SYNEPHRI aspiration  Tolerating TF  Atrial fibrillation - Not on AC  IV ABT, and Lasix  Bronchial hygene  CODE status DNR  Patient Active Problem List:     Hypertensive urgency     Accelerated hypertension     Abnormal EKG     Anxiety     Smoker     Depression

## 2020-02-24 NOTE — OCCUPATIONAL THERAPY NOTE
OCCUPATIONAL THERAPY TREATMENT NOTE - INPATIENT     Room Number: 6119/1468-A  Session: 1  Number of Visits to Meet Established Goals: 5    Presenting Problem: traumatic SAH    History related to current admission: Pt had fall at home and found to have [facility] MD GARRET at [facility] MAIN OR   • EYE CATARACT WITH INTRAOCULAR LENS Left 10/11/2018    Performed by Chin Garner MD at [facility] MAIN OR   • OTHER SURGICAL HISTORY  1962    Left leg fracture- pins   • REMOVAL GALLBLADDER         SUBJECTIVE  No subjective given.      Raffy Shaffer met;Call light within reach;RN aware of session/findings; All patient questions and concerns addressed;SCDs in place; Discussed recommendations with /    ASSESSMENT   Pt participated in session 1 of 5 following being admitted on 2/19 rehabilitation       PLAN  OT Treatment Plan: Balance activities; Energy conservation/work simplification techniques;ADL training;IADL training;Functional transfer training; Endurance training;Cognitive reorientation;Patient/Family education;Patient/Family t

## 2020-02-24 NOTE — PHYSICAL THERAPY NOTE
PHYSICAL THERAPY TREATMENT NOTE - INPATIENT    Room Number: 1180/6917-A     Session: 1   Number of Visits to Meet Established Goals: 5    Presenting Problem: Traumatic SDH/SAH    Problem List  Principal Problem:    Traumatic subarachnoid hemorrhage with l to attempt)  Dynamic Standing: Not tested(unable to attempt)    ACTIVITY TOLERANCE  O2 Saturation at rest 92% and with activity 88-90%  O2 Device: Vapotherm      COGNITION  · Overall Cognitive Status:  Impaired  · Arousal/Alertness:  inconsistent responses answered incorrectly, [place]. \"  The pt was cued for head turn to the right/neutral, but showed no active motion or eye tracking to the right in supine.       Pt completed supine>sit to EOB with total assist.  Pt sat upright x10 minutes with Mod A 90% of to actively participate in therapy.    Goal #4     Goal #5     Goal #6     Goal Comments: Goals established on 2/21/2020

## 2020-02-24 NOTE — DIETARY NOTE
[facility]    NUTRITION ASSESSMENT    Pt does not meet malnutrition criteria. NUTRITION DIAGNOSIS/PROBLEM:    Increased nutrient needs related to increased demands of healing as evidenced by TBI - ongoing     NUTRITION INTERVENTION:       1.  Entera needs)  Intake Meeting Needs: TF to meet needs once at goal  Food Allergies: No  Cultural/Ethnic/Christianity Preferences Addresses: Yes    NUTRITION RELATED PHYSICAL FINDINGS:     1. Body Fat/Muscle Mass: Not assessed at this time.      2. Fluid Accumulation:

## 2020-02-24 NOTE — PAYOR COMM NOTE
--------------  CONTINUED STAY REVIEW    Payor: [address] #:  [id number]  Authorization Number: [id number]    Admit date: 2/19/20  Admit time: 2304    Admitting Physician: Moni Meyer MD  Attending Physician:  Skylar Olivo MD mg, Rectal, Q4H PRN  niCARdipine HCl in NaCl (CARDENE) 20 mg/200 ml premix infusion, 5-15 mg/hr, Intravenous, Continuous PRN  phenylephrine in NaCl (STEFANY-SYNEPHRINE) 50 mg/250 ml premix infusion SOLN, 100-200 mcg/min, Intravenous, Continuous PRN  Senna (SEN neurochecks/pt/ot, NS on consult- rpt HCT stable, cont keppra for seizure prophylaxis, hold oac  Cardiac:  HTN/Afib- sbp goal 100-150, hold oac 2/2 above  Pulmonary:  Resp distress/hypoxia- cxr c/w pulm edema, lasix and abx per pulm  Renal:  IVFs, monitor 0.12 % solution 15 mL, 15 mL, Mouth/Throat, BID  •  acetaminophen (TYLENOL) 160 MG/5ML oral liquid 650 mg, 650 mg, Oral, Q6H PRN  •  insulin detemir (LEVEMIR) 100 UNIT/ML flextouch 10 Units, 10 Units, Subcutaneous, Q12H  •  levETIRAcetam (KEPPRA) 500 mg in Negative for easy bruising, bleeding, and lymphadenopathy. Musculoskeletal: Negative for myalgias, arthralgias, muscle weakness.   Neurological: Negative for headaches, dizziness, seizures, memory problems, trouble swallowing, speech problems, gait problem vs infection-aspiration vs HCAP?   · Traumatic SDH/SAH s/p mechanical fall  · afib on antiplatelet therapy now reversed in setting of traumatic fall  · Right clavicular fx s/p fall  · HTN  · Depression     Plan:  · Continue high flow O2  · Check ABG  · Repe

## 2020-02-24 NOTE — PLAN OF CARE
Pt opens eyes and tracks, occasional 1 word answers to questions. Pt required NT suctioning throughout the night, with oxygen sats increasing to 96% after suctioning. Michaeline Pour in place and changed in am with bath. Pt reached goal with TF.  Vitals signs stab ABGs  - Provide Smoking Cessation handout, if applicable  - Encourage broncho-pulmonary hygiene including cough, deep breathe, Incentive Spirometry  - Assess the need for suctioning and perform as needed  - Assess and instruct to report SOB or any respirat

## 2020-02-25 PROCEDURE — 99221 1ST HOSP IP/OBS SF/LOW 40: CPT | Performed by: INTERNAL MEDICINE

## 2020-02-25 NOTE — PROGRESS NOTES
[facility]  Neurosurgery Progress Note    Samuel Bartholomew Patient Status:  Inpatient    1940 MRN [id number]   [facility] 6NE-A Attending Palmira Johnson MD   [address] Day # 6 PCP Remi Fair MD     Chief Complaint:  AMS    Nash right lateral ventricle is stable. Paranasal sinuses, mastoid air cells and orbits are unremarkable.        =====  CONCLUSION:  No change since prior examination.     Assessment:  70year old female s/p fall with traumatic SAH w/ intraventricular extens

## 2020-02-25 NOTE — PROGRESS NOTES
[facility]  Neurocritical Care Progress Note     Gio Soriano Patient Status:  Inpatient    1940 MRN [id number]   [facility] 6NE-A Attending Angel Uriarte MD   Hosp Day # 6 PCP Lizzy Hernandez MD     Subjective: Nebulization, Q4H PRN  Piperacillin Sod-Tazobactam So (ZOSYN) 3.375 g in dextrose 5 % 100 mL ADD-vantage, 3.375 g, Intravenous, Q8H  nicotine (NICODERM CQ) 21 MG/24HR 1 patch, 1 patch, Transdermal, Daily  Chlorhexidine Gluconate (PERIDEX) 0.12 % solution 1 consolidation and effusion. The right chest is clear. Heart normal size. Nasogastric tube below the diaphragm.    Dictated by: Theresa Crawford MD on 2/25/2020 at 7:21     Approved by: Theresa Crawford MD on 2/25/2020 at 7:22          Lab Review     Recent

## 2020-02-25 NOTE — CONSULTS
[facility]  Report of Consultation    Liana Yates Patient Status:  Inpatient    1940 MRN [id number]   [facility] 6NE-A Attending Mayra Juarez MD   Hosp Day # 6 PCP Jeannie Winters MD     Reason Son    • Depression Son       reports that she has been smoking cigarettes. She has a 25.00 pack-year smoking history. She has never used smokeless tobacco. She reports that she does not drink alcohol or use drugs.     Allergies:  No Known Allergies    Medi sodium chloride 0.9% 100 mL IVPB, 500 mg, Intravenous, Q12H  •  LORazepam (ATIVAN) injection 0.5 mg, 0.5 mg, Intravenous, Once  •  acetaminophen (TYLENOL) tab 650 mg, 650 mg, Oral, Q4H PRN **OR** acetaminophen (TYLENOL) 650 MG rectal suppository 650 mg, 65 02/25/2020    MCHC 33.0 02/25/2020    RDW 14.8 02/25/2020    .0 02/25/2020     BUN (mg/dL)   Date Value   02/25/2020 86 (H)   02/24/2020 70 (H)   02/22/2020 42 (H)     UREA NITROGEN (BUN) (mg/dL)   Date Value   02/08/2020 32 (H)   03/09/2019 38 (H)

## 2020-02-25 NOTE — PROGRESS NOTES
[facility]  Progress Note    Angel Chi Patient Status:  Inpatient    1940 MRN [id number]   [facility] 6NE-A Attending Jovita Black MD   [facility] Day # 6 PCP Sigrid Johnston MD     Subjective:  Angel Chi is a(n) 70 year o 34.8*   .0*   < > 110.4* 108.7* 107.3* 107.4*   MCH 35.1*   < > 35.5* 35.3* 35.5* 35.5*   MCHC 32.2   < > 32.2 32.5 33.1 33.0   RDW 13.4   < > 14.0 13.9 14.6 14.8   NEPRELIM 3.27  --  6.54  --   --  8.40*   WBC 6.3   < > 8.0 9.0 10.8 9.8   . 0 dextrose in light of her hypernatremia  · Continue tube feeds  · Code status DNR    Yoli Martin SR.  Crit care 35  2/25/2020  7:20 AM

## 2020-02-25 NOTE — PHYSICAL THERAPY NOTE
Attempted to see pt for physical therapy treatment, but will hold treatment today as pt now requiring BIPAP for respiratory support and pressors for hypotension. Will plan to re-attempt to see pt again tomorrow.

## 2020-02-25 NOTE — OCCUPATIONAL THERAPY NOTE
Attempted to see pt this AM, pt not approp for therapy per RN at this time, pt not following commands and requiring more medical care today, OT will follow up tomorrow, RN in agreement.

## 2020-02-25 NOTE — PLAN OF CARE
Assumed care of patient at 0700. Pupils equal and reactive. Not following commands. Moves all extremities minimally to pain. Patient in a-fib RVR and hypotensive at shift change. Along with increased work of breathing on Bi-Pap. Dr. Che Benton at bedside.  O ordered parameters to optimize cerebral perfusion and minimize risk of hemorrhage  - Monitor temperature, glucose, and sodium.  Initiate appropriate interventions as ordered  Outcome: Not Progressing  Goal: Achieves maximal functionality and self care  Desc

## 2020-02-25 NOTE — PAYOR COMM NOTE
--------------  CONTINUED STAY REVIEW    Payor: [address] [place] [address] #:  [id number]  Authorization Number: [id number]    Admit date: 2/19/20  Admit time: 2304    Admitting Physician: Hannah Kennedy MD  Attending Physician:  Mary Guo MD cyanosis                Neurological: Not interactive at present     Lab Data Review:           Recent Labs   Lab 02/19/20  1858   02/21/20  0417 02/22/20  0415 02/24/20  0438 02/25/20  0431   RBC 3.56*   < > 3.07* 3.00* 3.44* 3.24*   HGB 12.5   < > 10.9* hemorrhage  · Right clavicular fx s/p fall  · Azotemia with a chronic creatinine of approximately 1.4-1.5  · HTN  · Chronic macrocytosis suggesting bone marrow issues-??  Early MDS  · Depression     Plan:  · Continue bronchopulmonary toilet measures  · Obta mg     Date Action Dose Route User    2/25/2020 0120 Given 10 mg Intravenous Nithin Phelps RN      Heparin Sodium (Porcine) 5000 UNIT/ML injection 5,000 Units     Date Action Dose Route User    2/25/2020 3964 Given 5000 Units Subcutaneous (Left Lower A phenylephrine in NaCl (STEFANY-SYNEPHRINE) 50 mg/250 ml premix infusion SOLN     Date Action Dose Route User    2/25/2020 0913 New Bag 100 mcg/min Intravenous Tony Murillo, RN      Piperacillin Sod-Tazobactam So (ZOSYN) 3.375 g in dextrose 5 % 100 mL ADD

## 2020-02-25 NOTE — PROGRESS NOTES
02/25/20 0940   BiPAP   $ RT Standby Charge (per 15 min) 1   Device V60   BiPAP / CPAP CE# [id number]   Mode Spontaneous/Timed   Interface Full face mask   Mask Size Medium   Control Settings   Set Rate 12 breaths/min   Set IPAP 12   Set EPAP 6   Oxygen Perce

## 2020-02-25 NOTE — CM/SW NOTE
Care Progression Note:  Active Acute Medical Issue:  Traumatic subarachnoid hemorrhage with loss of consciousness of 30 minutes or less, initial encounter ([facility] [address] s/p mechanical fall  Hypoxemic respiratory failure-on BIPAP  Afib w/ RVR-unable to tolerate car

## 2020-02-25 NOTE — PLAN OF CARE
Received pt at 1900, pt was on Bipap, sats in the 60's, resp aware, and Bipap FIQ2 increased to 100% and suctioned, pt did recover and Bipap was able to be weaned to FIO2 of 40% throughout the shift.  Pt rhythm was in afib and NSR/ST but remained in Afib wi

## 2020-02-25 NOTE — PROGRESS NOTES
02/25/20 0359   BiPAP   $ RT Standby Charge (per 15 min) 1   Device V60   BiPAP / CPAP CE# [id number]   Mode Spontaneous/Timed   Interface Full face mask   Mask Size Medium   Control Settings   Set Rate 12 breaths/min   Set IPAP 12   Set EPAP 6   Oxygen Perce

## 2020-02-25 NOTE — SLP NOTE
Attempted to see pt at bedside this AM. Pt not appropriate for reassessment per RN- pt requiring bipap at this time. Will follow up as scheduling permits. Thank you.

## 2020-02-26 ENCOUNTER — ANTI-COAG (OUTPATIENT)
Dept: CARDIOLOGY | Age: 80
End: 2020-02-26

## 2020-02-26 DIAGNOSIS — I48.91 ATRIAL FIBRILLATION, UNSPECIFIED TYPE (CMD): ICD-10-CM

## 2020-02-26 NOTE — PLAN OF CARE
2000- Received Patient from CCU. Patient is alert to self, lethargic, moans when in pain. 2L O2. Diminished urine production, brief in place. Pain controlled with Morphine GTT. Bedrest, turn q 2. R arm precaution, sling in place.  Regular diet, pleasur mentation and behavior  - Position to facilitate oxygenation and minimize respiratory effort  - Oxygen supplementation based on oxygen saturation or ABGs  - Provide Smoking Cessation handout, if applicable  - Encourage broncho-pulmonary hygiene including c Comfort    Interventions:   - Pain medication  - Family at bedside  - See additional Care Plan goals for specific interventions   Outcome: Progressing

## 2020-02-26 NOTE — PROGRESS NOTES
2813- Patient  at this time with comfort care measures in place. Writing RN assessed patient determined patient had no pulse and was not breathing. Confirmed with Charge RN. Family updated on findings, comfort and support given.  Doctors notified of

## 2020-02-26 NOTE — PAYOR COMM NOTE
--------------  DISCHARGE REVIEW    Payor: [address] [place] [address] #:  [id number]  Authorization Number: [id number]    Admit date: 2/19/20  Admit time:  2304  Discharge Date: 2/26/2020  6:15 AM     Admitting Physician: Randall Aschoff, MD  Attendin

## 2020-02-26 NOTE — PROGRESS NOTES
[facility]  Progress Note    [facility] Patient Status:  Inpatient    1940 MRN [id number]   [facility] 6NE-A Attending Mary Guo MD   Hosp Day # 6 PCP Olivia French MD   Seen earlier in AM  70year old female  With AF o CQ) 21 MG/24HR 1 patch, 1 patch, Transdermal, Daily  Chlorhexidine Gluconate (PERIDEX) 0.12 % solution 15 mL, 15 mL, Mouth/Throat, BID  acetaminophen (TYLENOL) 160 MG/5ML oral liquid 650 mg, 650 mg, Oral, Q6H PRN  acetaminophen (TYLENOL) tab 650 mg, 650 mg TF  Atrial fibrillation - Not on AC  IV ABT, and Lasix  Bronchial hygene  CODE status DNR  Patient Active Problem List:     Hypertensive urgency     Accelerated hypertension     Abnormal EKG     Anxiety     Smoker     Depression     Atrial fibrillation (HC

## 2020-03-10 NOTE — TELEPHONE ENCOUNTER
LM on unidentified VM for patient to call. Needs Medicare Annual Wellness after 4/29/17.
Detail Level: Zone
Plan: RTC 2 months to reassess or when flaring
Initiate Treatment: Clobex shampoo - sent to [facility] \\nKetoconazole shampoo- sent to [facility] \\nMedrolPak \\nDiflucan 1 tab every 2 weeks

## 2020-03-31 NOTE — DISCHARGE SUMMARY
[facility]  Discharge Summary    Jg Tinsley Patient Status:  Inpatient    1940 MRN [id number]   [facility] 3NW-A Attending No att. providers found   Hosp Day # 7 PCP Kwasi Gant MD     Date of Admission: 2020    Date

## 2020-04-13 ENCOUNTER — APPOINTMENT (OUTPATIENT)
Dept: CARDIOLOGY | Age: 80
End: 2020-04-13

## 2020-07-18 NOTE — PHYSICAL THERAPY NOTE
PT orders received and pt chart reviewed. Pt is on 24hr bedrest starting at time of admission 6:49pm 2/19/20. Will hold PT until bedrest is cleared and new activity orders placed.  Thank you [employer]

## (undated) DEVICE — ACTIVE FMS W/ INTREPID* ULTRA SLEEVES, 0.9MM 30° ABS* INTREPID* BALANCED TIP: Brand: ALCON

## (undated) DEVICE — CATARACT PATIENT CARE KIT

## (undated) DEVICE — GLOVE SURG SENSICARE SZ 7-1/2

## (undated) DEVICE — BSS BAG CENTURION

## (undated) DEVICE — PREP BETADINE SOL 5% EYE

## (undated) DEVICE — UNFOLDER PLATINUM 1 SERIES CRTRDG 30/BOX: Brand: UNFOLDER PLATINUM 1 SERIES

## (undated) DEVICE — SOL H2O 1000ML BTL

## (undated) DEVICE — EYE PACK: Brand: MEDLINE INDUSTRIES, INC.

## (undated) DEVICE — CLEARCUT® SIDEPORT KNIFE DUAL BEVEL 1.0MM ANGLED: Brand: CLEARCUT®

## (undated) DEVICE — BLADE ADVANCUT CORNEAL 2.4MM

## (undated) DEVICE — SINGLE USE MEDICAL DEVICE FOR OPHTHALMIC SURGERY: Brand: SIL. COATED I/A 45 MIL 12/B

## (undated) NOTE — LETTER
[address] [facility] FOR BLOOD OR BLOOD COMPONENTS      In the course of your treatment, it may become necessary to administer a transfusion of blood or blood components.  This form provides basic information concerning this proc explain the alternatives to you if it has not already been done. Yuki Paris, have read/had read to me the above. I understand the matters bearing on the decision whether or not to authorize a transfusion of blood or blood components.  I have no que

## (undated) NOTE — MR AVS SNAPSHOT
[facility], [address] [phone]               Thank you for choosing us for your health care visit with Lisa Rosenbaum MD.  We are glad to serve you and happy to provide you with this [place] Increase hydration. Call [phone] to schedule  bone density scan  - screening for osteoporosis. Do test from the stool for colon cancer screening. Do fasting blood work. Follow-up medication check in 6 months. Work on smoking cessation.     Alejandra Cota meet one of the following criteria:   • Men who are 73-68 years old and have smoked more than 100 cigarettes in their lifetime   • Anyone with a family history    Colorectal Cancer Screening  Covered up to Age 76     Colonoscopy Screen   Covered every 10 y Covered Annually No orders found for this or any previous visit.  Please get every year    Pneumococcal 13 (Prevnar)  Covered Once after 65   Orders placed or performed in visit on 05/15/15  -PNEUMOCOCCAL VACC, 13 TADEO IM    Please get once after your 67th b No Known Allergies                Today's Vital Signs     BP Pulse Temp Height Weight BMI    116/64 mmHg 86 96.9 °F (36.1 °C) (Oral) 62\" 129 lb 23.59 kg/m2    Breastfeeding?                    No              Current Medications          This list is accu - Metoprolol Succinate ER 50 MG Tb24            MyChart     Call the [employer] for assistance with your inactive [employer] account    If you have questions, you can call [phone] to talk to our [facility] Staff.  Remember, 5 CUPS and some sugar is NOT to be used ? Use a cane or walker (indoors and out) if you are unsteady on your feet.              Visit [facility] online at  [email or web address]

## (undated) NOTE — LETTER
12/1/2017    Celia4 Pipo Hernandez    Dear Ms. Mariea Runner office has been trying to contact you to discuss your recent test results or you are due for an appointment with your provider. It is important that we reach you to discuss your healthcare needs. Please contact our office at your earliest convenience. Also,  Did you know that you can receive test result information and reminders securely on line through [address] [place] [address] [phone]? To register for [employer], open your Internet browser and go to [email or web address] and click \"sign up now\". Follow the on-screen instructions to complete your registration. Thank you for your prompt attention to this matter. You may reach our office at [phone].      Sincerely,      [employer] and Staff

## (undated) NOTE — LETTER
11/10/2017      [address] [id number]    Dear Ms. Charo Montano office has been trying to contact you to discuss your recent test results or you are due for an appointment with your provider.   It is important that we